# Patient Record
Sex: MALE | Race: WHITE | Employment: OTHER | URBAN - METROPOLITAN AREA
[De-identification: names, ages, dates, MRNs, and addresses within clinical notes are randomized per-mention and may not be internally consistent; named-entity substitution may affect disease eponyms.]

---

## 2017-04-17 ENCOUNTER — HOSPITAL ENCOUNTER (OUTPATIENT)
Dept: ULTRASOUND IMAGING | Age: 57
Discharge: HOME | End: 2017-04-17
Attending: FAMILY MEDICINE
Payer: COMMERCIAL

## 2017-04-17 DIAGNOSIS — R79.89 ELEVATED LIVER FUNCTION TESTS: ICD-10-CM

## 2017-04-17 PROCEDURE — 76700 US EXAM ABDOM COMPLETE: CPT

## 2017-04-17 NOTE — PROGRESS NOTES
US shows fatty liver disease. Recommend low fat/low cholesterol diet, avoiding liver toxic substances such as excessive alcohol, acetaminophen/tylenol, high dose statin therapy.    My chart message sent

## 2018-05-16 ENCOUNTER — APPOINTMENT (RX ONLY)
Dept: URBAN - METROPOLITAN AREA CLINIC 349 | Facility: CLINIC | Age: 58
Setting detail: DERMATOLOGY
End: 2018-05-16

## 2018-05-16 DIAGNOSIS — L82.1 OTHER SEBORRHEIC KERATOSIS: ICD-10-CM

## 2018-05-16 DIAGNOSIS — L82.0 INFLAMED SEBORRHEIC KERATOSIS: ICD-10-CM

## 2018-05-16 DIAGNOSIS — Z71.89 OTHER SPECIFIED COUNSELING: ICD-10-CM

## 2018-05-16 PROCEDURE — 17110 DESTRUCTION B9 LES UP TO 14: CPT

## 2018-05-16 PROCEDURE — ? COUNSELING

## 2018-05-16 PROCEDURE — ? LIQUID NITROGEN

## 2018-05-16 PROCEDURE — 99202 OFFICE O/P NEW SF 15 MIN: CPT | Mod: 25

## 2018-05-16 PROCEDURE — ? BENIGN DESTRUCTION

## 2018-05-16 ASSESSMENT — LOCATION SIMPLE DESCRIPTION DERM
LOCATION SIMPLE: ABDOMEN
LOCATION SIMPLE: RIGHT CLAVICULAR SKIN
LOCATION SIMPLE: ABDOMEN
LOCATION SIMPLE: RIGHT CHEEK
LOCATION SIMPLE: LEFT INFERIOR EYELID
LOCATION SIMPLE: LEFT UPPER BACK
LOCATION SIMPLE: RIGHT SCALP
LOCATION SIMPLE: RIGHT ANTERIOR NECK
LOCATION SIMPLE: RIGHT FOREHEAD
LOCATION SIMPLE: LEFT UPPER BACK
LOCATION SIMPLE: CHEST
LOCATION SIMPLE: RIGHT CHEEK
LOCATION SIMPLE: RIGHT UPPER BACK

## 2018-05-16 ASSESSMENT — LOCATION DETAILED DESCRIPTION DERM
LOCATION DETAILED: RIGHT CENTRAL FRONTAL SCALP
LOCATION DETAILED: RIGHT INFERIOR MEDIAL UPPER BACK
LOCATION DETAILED: LEFT MEDIAL INFERIOR EYELID
LOCATION DETAILED: RIGHT CENTRAL MALAR CHEEK
LOCATION DETAILED: LEFT LATERAL ABDOMEN
LOCATION DETAILED: RIGHT CLAVICULAR NECK
LOCATION DETAILED: LEFT SUPERIOR UPPER BACK
LOCATION DETAILED: RIGHT CENTRAL MALAR CHEEK
LOCATION DETAILED: EPIGASTRIC SKIN
LOCATION DETAILED: RIGHT CLAVICULAR SKIN
LOCATION DETAILED: RIGHT SUPERIOR FOREHEAD
LOCATION DETAILED: LEFT MEDIAL INFERIOR CHEST
LOCATION DETAILED: LEFT MID-UPPER BACK

## 2018-05-16 ASSESSMENT — LOCATION ZONE DERM
LOCATION ZONE: NECK
LOCATION ZONE: FACE
LOCATION ZONE: SCALP
LOCATION ZONE: EYELID
LOCATION ZONE: TRUNK
LOCATION ZONE: FACE
LOCATION ZONE: TRUNK

## 2018-05-16 NOTE — PROCEDURE: BENIGN DESTRUCTION
Medical Necessity Clause: This procedure was medically necessary because the lesions that were treated were:
Anesthesia Volume In Cc: 0
Post-Care Instructions: I reviewed with the patient in detail post-care instructions. Patient is to wear sunprotection, and avoid picking at any of the treated lesions. Pt may apply Vaseline to crusted or scabbing areas.
Add 52 Modifier (Optional): no
Detail Level: Zone
Include Z78.9 (Other Specified Conditions Influencing Health Status) As An Associated Diagnosis?: Yes
Medical Necessity Information: It is in your best interest to select a reason for this procedure from the list below. All of these items fulfill various CMS LCD requirements except the new and changing color options.
Consent: The patient's consent was obtained including but not limited to risks of crusting, scabbing, blistering, scarring, darker or lighter pigmentary change, recurrence, incomplete removal and infection.

## 2018-05-16 NOTE — PROCEDURE: LIQUID NITROGEN
Include Z78.9 (Other Specified Conditions Influencing Health Status) As An Associated Diagnosis?: Yes
Medical Necessity Information: It is in your best interest to select a reason for this procedure from the list below. All of these items fulfill various CMS LCD requirements except the new and changing color options.
Post-Care Instructions: I reviewed with the patient in detail post-care instructions. Patient is to wear sunprotection, and avoid picking at any of the treated lesions. Pt may apply Vaseline to crusted or scabbing areas.
Render Post-Care Instructions In Note?: no
Detail Level: Detailed
Number Of Freeze-Thaw Cycles: 1 freeze-thaw cycle
Consent: The patient's consent was obtained including but not limited to risks of crusting, scabbing, blistering, scarring, darker or lighter pigmentary change, recurrence, incomplete removal and infection.
Medical Necessity Clause: This procedure was medically necessary because the lesions that were treated were:

## 2019-05-15 ENCOUNTER — APPOINTMENT (RX ONLY)
Dept: URBAN - METROPOLITAN AREA CLINIC 349 | Facility: CLINIC | Age: 59
Setting detail: DERMATOLOGY
End: 2019-05-15

## 2019-05-15 DIAGNOSIS — L82.1 OTHER SEBORRHEIC KERATOSIS: ICD-10-CM

## 2019-05-15 DIAGNOSIS — L82.0 INFLAMED SEBORRHEIC KERATOSIS: ICD-10-CM

## 2019-05-15 PROCEDURE — ? BENIGN DESTRUCTION

## 2019-05-15 PROCEDURE — 99213 OFFICE O/P EST LOW 20 MIN: CPT | Mod: 25

## 2019-05-15 PROCEDURE — ? COUNSELING

## 2019-05-15 PROCEDURE — 17110 DESTRUCTION B9 LES UP TO 14: CPT

## 2019-05-15 PROCEDURE — ? LIQUID NITROGEN

## 2019-05-15 ASSESSMENT — LOCATION DETAILED DESCRIPTION DERM
LOCATION DETAILED: EPIGASTRIC SKIN
LOCATION DETAILED: LEFT MEDIAL INFERIOR CHEST
LOCATION DETAILED: RIGHT CENTRAL MALAR CHEEK
LOCATION DETAILED: LEFT MEDIAL INFERIOR EYELID
LOCATION DETAILED: LEFT MID-UPPER BACK
LOCATION DETAILED: LEFT DISTAL POSTERIOR UPPER ARM
LOCATION DETAILED: RIGHT CENTRAL MALAR CHEEK
LOCATION DETAILED: RIGHT SUPERIOR CENTRAL MALAR CHEEK
LOCATION DETAILED: LEFT SUPERIOR MEDIAL MALAR CHEEK
LOCATION DETAILED: RIGHT INFERIOR MEDIAL UPPER BACK
LOCATION DETAILED: LEFT SUPERIOR UPPER BACK
LOCATION DETAILED: LEFT DISTAL POSTERIOR UPPER ARM
LOCATION DETAILED: LEFT LATERAL ABDOMEN
LOCATION DETAILED: RIGHT SUPERIOR MEDIAL MALAR CHEEK

## 2019-05-15 ASSESSMENT — LOCATION SIMPLE DESCRIPTION DERM
LOCATION SIMPLE: RIGHT UPPER BACK
LOCATION SIMPLE: RIGHT CHEEK
LOCATION SIMPLE: RIGHT CHEEK
LOCATION SIMPLE: ABDOMEN
LOCATION SIMPLE: LEFT CHEEK
LOCATION SIMPLE: LEFT UPPER ARM
LOCATION SIMPLE: CHEST
LOCATION SIMPLE: LEFT UPPER ARM
LOCATION SIMPLE: LEFT UPPER BACK
LOCATION SIMPLE: ABDOMEN
LOCATION SIMPLE: LEFT INFERIOR EYELID
LOCATION SIMPLE: LEFT UPPER BACK

## 2019-05-15 ASSESSMENT — LOCATION ZONE DERM
LOCATION ZONE: ARM
LOCATION ZONE: EYELID
LOCATION ZONE: FACE
LOCATION ZONE: TRUNK
LOCATION ZONE: TRUNK
LOCATION ZONE: FACE
LOCATION ZONE: ARM

## 2019-05-15 NOTE — PROCEDURE: BENIGN DESTRUCTION
Post-Care Instructions: I reviewed with the patient in detail post-care instructions. Patient is to wear sunprotection, and avoid picking at any of the treated lesions. Pt may apply Vaseline to crusted or scabbing areas.
Render Note In Bullet Format When Appropriate: No
Medical Necessity Clause: This procedure was medically necessary because the lesions that were treated were:
Treatment Number (Will Not Render If 0): 0
Consent: The patient's consent was obtained including but not limited to risks of crusting, scabbing, blistering, scarring, darker or lighter pigmentary change, recurrence, incomplete removal and infection.
Detail Level: Zone
Include Z78.9 (Other Specified Conditions Influencing Health Status) As An Associated Diagnosis?: Yes
Medical Necessity Information: It is in your best interest to select a reason for this procedure from the list below. All of these items fulfill various CMS LCD requirements except the new and changing color options.

## 2019-05-15 NOTE — PROCEDURE: LIQUID NITROGEN
Render Post-Care Instructions In Note?: no
Include Z78.9 (Other Specified Conditions Influencing Health Status) As An Associated Diagnosis?: Yes
Post-Care Instructions: I reviewed with the patient in detail post-care instructions. Patient is to wear sunprotection, and avoid picking at any of the treated lesions. Pt may apply Vaseline to crusted or scabbing areas.
Medical Necessity Information: It is in your best interest to select a reason for this procedure from the list below. All of these items fulfill various CMS LCD requirements except the new and changing color options.
Detail Level: Detailed
Consent: The patient's consent was obtained including but not limited to risks of crusting, scabbing, blistering, scarring, darker or lighter pigmentary change, recurrence, incomplete removal and infection.
Duration Of Freeze Thaw-Cycle (Seconds): 2
Number Of Freeze-Thaw Cycles: 3 freeze-thaw cycles
Medical Necessity Clause: This procedure was medically necessary because the lesions that were treated were:

## 2019-10-17 ENCOUNTER — HOSPITAL ENCOUNTER (OUTPATIENT)
Dept: CT IMAGING | Age: 59
Discharge: HOME | End: 2019-10-17
Payer: COMMERCIAL

## 2019-10-17 DIAGNOSIS — R91.1 LUNG NODULE: ICD-10-CM

## 2019-10-17 PROCEDURE — 71250 CT THORAX DX C-: CPT

## 2020-05-19 ENCOUNTER — APPOINTMENT (RX ONLY)
Dept: URBAN - METROPOLITAN AREA CLINIC 349 | Facility: CLINIC | Age: 60
Setting detail: DERMATOLOGY
End: 2020-05-19

## 2020-05-19 DIAGNOSIS — L82.1 OTHER SEBORRHEIC KERATOSIS: ICD-10-CM

## 2020-05-19 DIAGNOSIS — L82.0 INFLAMED SEBORRHEIC KERATOSIS: ICD-10-CM | Status: INADEQUATELY CONTROLLED

## 2020-05-19 DIAGNOSIS — D22 MELANOCYTIC NEVI: ICD-10-CM

## 2020-05-19 DIAGNOSIS — Z12.83 ENCOUNTER FOR SCREENING FOR MALIGNANT NEOPLASM OF SKIN: ICD-10-CM

## 2020-05-19 PROBLEM — D22.5 MELANOCYTIC NEVI OF TRUNK: Status: ACTIVE | Noted: 2020-05-19

## 2020-05-19 PROCEDURE — ? COUNSELING

## 2020-05-19 PROCEDURE — ? LIQUID NITROGEN

## 2020-05-19 PROCEDURE — 17110 DESTRUCTION B9 LES UP TO 14: CPT

## 2020-05-19 PROCEDURE — 99213 OFFICE O/P EST LOW 20 MIN: CPT | Mod: 25

## 2020-05-19 ASSESSMENT — LOCATION DETAILED DESCRIPTION DERM
LOCATION DETAILED: RIGHT PROXIMAL CALF
LOCATION DETAILED: SUPERIOR THORACIC SPINE
LOCATION DETAILED: RIGHT MEDIAL UPPER BACK
LOCATION DETAILED: RIGHT INFERIOR MEDIAL UPPER BACK
LOCATION DETAILED: RIGHT PROXIMAL PRETIBIAL REGION
LOCATION DETAILED: LEFT SUPERIOR MEDIAL UPPER BACK
LOCATION DETAILED: RIGHT ANTERIOR DISTAL THIGH

## 2020-05-19 ASSESSMENT — LOCATION ZONE DERM
LOCATION ZONE: LEG
LOCATION ZONE: TRUNK

## 2020-05-19 ASSESSMENT — LOCATION SIMPLE DESCRIPTION DERM
LOCATION SIMPLE: UPPER BACK
LOCATION SIMPLE: RIGHT PRETIBIAL REGION
LOCATION SIMPLE: RIGHT CALF
LOCATION SIMPLE: RIGHT UPPER BACK
LOCATION SIMPLE: LEFT UPPER BACK
LOCATION SIMPLE: RIGHT THIGH

## 2020-05-19 NOTE — PROCEDURE: LIQUID NITROGEN
Include Z78.9 (Other Specified Conditions Influencing Health Status) As An Associated Diagnosis?: Yes
Medical Necessity Clause: This procedure was medically necessary because the lesions that were treated were:
Post-Care Instructions: I reviewed with the patient in detail post-care instructions. Patient is to wear sunprotection, and avoid picking at any of the treated lesions. Pt may apply Vaseline to crusted or scabbing areas.
Medical Necessity Information: It is in your best interest to select a reason for this procedure from the list below. All of these items fulfill various CMS LCD requirements except the new and changing color options.
Consent: The patient's consent was obtained including but not limited to risks of crusting, scabbing, blistering, scarring, darker or lighter pigmentary change, recurrence, incomplete removal and infection.
Detail Level: Detailed
Duration Of Freeze Thaw-Cycle (Seconds): 2
Render Post-Care Instructions In Note?: no
Number Of Freeze-Thaw Cycles: 3 freeze-thaw cycles

## 2020-09-29 NOTE — H&P (VIEW-ONLY)
Jewel Gallardo MD, [facility], [address] [place] [address]  Tyron Paez  Phone [phone]   Fax [phone]      Date of visit: 9/29/2020     Primary/Requesting provider: Ya Meza MD    Chief Complaint   Patient presents with    New Patient     umbilical hernia          HISTORY OF PRESENT ILLNESS  Orval Escalera is a 62 y.o. male. HPI  Patient is a 62 y.o. male who presents for evaluation of a hernia. This is located in his umbilicus. He has been aware of this for 2-3 years. He doesn't think it is increasing in size, at its worst, although it does fluctuate in size. There is occasional tenderness to touch but it doesn't seem to be prohibiting or limiting him in his gym rehab from back surgery. He also denies nausea, vomiting, or alteration of baseline bowel or bladder function. He has not had any prior umbilical surgery. Medications:   Current Outpatient Medications   Medication Sig    gabapentin (NEURONTIN) 600 mg tablet Take 1 Tab by mouth two (2) times a day.  amLODIPine-benazepril (LOTREL) 5-10 mg per capsule Take 1 Cap by mouth daily.  omeprazole (PRILOSEC) 20 mg capsule Take 1 Cap by mouth daily.  therapeutic multivitamin (THERAGRAN) tablet Take 1 Tab by mouth. No current facility-administered medications for this visit. Allergies:    Allergies   Allergen Reactions    Penicillin G Hives        Past History:  Past Medical History:   Diagnosis Date    Arthritis     Chronic pain     C6-C7 bulging disc; Neuro eval. 2010    Colon polyps     Elevated liver function tests 2013    while on fenofibrate    Hypercholesterolemia     Hypertension     Polycythemia 2013    normal erythropoietin level      Past Surgical History:   Procedure Laterality Date    HX BACK SURGERY  04/2020    L3-4 microdiskectomy    HX COLONOSCOPY  6/2014, 7/19/18    repeat 5 yrs    HX ORTHOPAEDIC  06/2016    right elbow with torn tendons    HX ORTHOPAEDIC  age 12    fractured leg Family and Social History:  Family History   Problem Relation Age of Onset    Heart Failure Mother     No Known Problems Father     No Known Problems Brother     No Known Problems Brother     Colon Cancer Other         cousin  age 36    Cancer Other         colon cancer in first cousin    Diabetes Maternal Grandfather     Cancer Maternal Aunt         oral cancer     Social History     Socioeconomic History    Marital status:      Spouse name: Not on file    Number of children: Not on file    Years of education: Not on file    Highest education level: Not on file   Occupational History    Not on file   Social Needs    Financial resource strain: Not on file    Food insecurity     Worry: Not on file     Inability: Not on file   [employer] needs     Medical: Not on file     Non-medical: Not on file   Tobacco Use    Smoking status: Current Some Day Smoker     Packs/day: 0.10     Years: 20.00     Pack years: 2.00     Types: Cigars    Smokeless tobacco: Never Used    Tobacco comment: cigars. Substance and Sexual Activity    Alcohol use:  Yes     Alcohol/week: 6.0 standard drinks     Types: 6 Cans of beer per week    Drug use: No    Sexual activity: Not on file   Lifestyle    Physical activity     Days per week: Not on file     Minutes per session: Not on file    Stress: Not on file   Relationships    Social connections     Talks on phone: Not on file     Gets together: Not on file     Attends [employer] service: Not on file     Active member of club or organization: Not on file     Attends meetings of clubs or organizations: Not on file     Relationship status: Not on file    Intimate partner violence     Fear of current or ex partner: Not on file     Emotionally abused: Not on file     Physically abused: Not on file     Forced sexual activity: Not on file   Other Topics Concern    Not on file   Social History Narrative    Not on file       Review of Systems Constitutional: Negative. HENT: Negative. Eyes:        Glasses   Respiratory: Negative. Cardiovascular: Negative. Gastrointestinal: Negative. Genitourinary: Negative. Musculoskeletal: Positive for back pain. Reports right leg muscle mass loss due to back issues   Skin:        Umbilical hernia   Neurological: Negative. Endo/Heme/Allergies: Negative. Psychiatric/Behavioral: Negative. Physical Exam  Vitals signs and nursing note reviewed. Constitutional:       Appearance: He is well-developed. He is not toxic-appearing or diaphoretic. HENT:      Head: Normocephalic and atraumatic. Eyes:      General: No scleral icterus. Conjunctiva/sclera: Conjunctivae normal.      Pupils: Pupils are equal, round, and reactive to light. Neck:      Musculoskeletal: Normal range of motion. Thyroid: No thyromegaly. Vascular: No JVD. Trachea: No tracheal deviation. Cardiovascular:      Rate and Rhythm: Normal rate and regular rhythm. Heart sounds: No murmur. No friction rub. No gallop. Pulmonary:      Effort: Pulmonary effort is normal. No respiratory distress. Breath sounds: Normal breath sounds. No wheezing or rales. Abdominal:      General: There is no distension. Palpations: Abdomen is soft. There is no mass. Tenderness: There is no abdominal tenderness. Hernia: A hernia (fat containing umbilical hernia, approx 15mm defect) is present. Musculoskeletal:      Comments: No gross deformities   Skin:     General: Skin is warm. Neurological:      General: No focal deficit present. Mental Status: He is alert and oriented to person, place, and time. Cranial Nerves: No cranial nerve deficit. Psychiatric:         Mood and Affect: Mood normal.         Behavior: Behavior normal. Behavior is cooperative. Thought Content:  Thought content normal.         Judgment: Judgment normal.         ASSESSMENT and PLAN  Encounter Diagnoses Name Primary?  Umbilical hernia without obstruction and without gangrene Yes     Discussed low risk of enteric incarceration/strangulation event. There is a significant likelihood, however, of enlargement and increasing discomfort/inconvenience. Repair is appropriate, as is observation. After discussion, he prefers repair. Will proceed with  umbilical hernia repair with mesh. Technical details of the procedure are reviewed. Risks reviewed include risks of anesthesia, bleeding, infection, visceral injury, persistent post-operative pain, and hernia recurrence. All questions are answered.

## 2020-10-01 ENCOUNTER — HOSPITAL ENCOUNTER (OUTPATIENT)
Dept: SURGERY | Age: 60
Discharge: HOME | End: 2020-10-01

## 2020-10-02 VITALS — WEIGHT: 200 LBS | BODY MASS INDEX: 26.51 KG/M2 | HEIGHT: 73 IN

## 2020-10-02 NOTE — PERIOP NOTES
Patient verified name and . Order for consent was found in EHR and matches case posting; patient verifies procedure. Type 2 surgery, PAT phone assessment complete. Orders not received. Labs per surgeon: none  Labs per anesthesia protocol: Hgb  Patient instructed to come to [id number] [place] [address], [address] Monday-Friday [id number] prior to DOS to have blood drawn and ask for hibiclen. No appointment necessary. Patient verbalized understanding      Patient answered medical/surgical history questions at their best of ability. All prior to admission medications documented in [facility]. Patient instructed to take the following medications the day of surgery according to anesthesia guidelines with a small sip of waterLotrel, Gabapentin and Omeprazole: Hold all vitamins 7 days prior to surgery and NSAIDS 5 days prior to surgery. Prescription meds to hold:None    Patient instructed on the following:    > a negative Covid swab result is required to proceed with surgery;  appointments are made by the surgeon office and test should be collected 7 days prior to surgery. The testing center is located at the [address] [place] [address].   > 1 visitor allowed at this time. >Arrive at [facility], time of arrival to be called the day before by 1700  >NPO after midnight including gum, mints, and ice chips  >Responsible adult must drive patient to the hospital, stay during surgery, and patient will need supervision 24 hours after anesthesia  >Use hibiclens soap in shower the night before surgery and on the morning of surgery  >All piercings must be removed prior to arrival.    >Leave all valuables (money and jewelry) at home but bring insurance card and ID on       DOS. >Do not wear make-up, nail polish, lotions, cologne, perfumes, powders, or oil on skin.

## 2020-10-06 ENCOUNTER — HOSPITAL ENCOUNTER (OUTPATIENT)
Dept: LAB | Age: 60
Discharge: HOME | End: 2020-10-06
Attending: SURGERY
Payer: COMMERCIAL

## 2020-10-06 LAB — HGB BLD-MCNC: 12 G/DL (ref 13.6–17.2)

## 2020-10-06 PROCEDURE — 85018 HEMOGLOBIN: CPT

## 2020-10-06 PROCEDURE — 36415 COLL VENOUS BLD VENIPUNCTURE: CPT

## 2020-10-07 ENCOUNTER — ANESTHESIA EVENT (OUTPATIENT)
Dept: SURGERY | Age: 60
End: 2020-10-07
Payer: COMMERCIAL

## 2020-10-07 NOTE — PERIOP NOTES
HEMOGLOBIN [[id number]] (Order [id number])   Lab   Date: 10/6/2020  Department: [facility]  Released By: Baltazar Chau (auto-released)  Authorizing: Alvarado Guevara MD    Component  Value  Flag  Ref Range  Units  Status    HGB  12.0          HGB within [facility] protocols.

## 2020-10-08 ENCOUNTER — HOSPITAL ENCOUNTER (OUTPATIENT)
Age: 60
Setting detail: OUTPATIENT SURGERY
Discharge: HOME | End: 2020-10-08
Attending: SURGERY | Admitting: SURGERY
Payer: COMMERCIAL

## 2020-10-08 ENCOUNTER — ANESTHESIA (OUTPATIENT)
Dept: SURGERY | Age: 60
End: 2020-10-08
Payer: COMMERCIAL

## 2020-10-08 VITALS
BODY MASS INDEX: 27.59 KG/M2 | RESPIRATION RATE: 16 BRPM | SYSTOLIC BLOOD PRESSURE: 129 MMHG | OXYGEN SATURATION: 94 % | WEIGHT: 203.7 LBS | HEART RATE: 67 BPM | HEIGHT: 72 IN | DIASTOLIC BLOOD PRESSURE: 64 MMHG | TEMPERATURE: 98.3 F

## 2020-10-08 DIAGNOSIS — K42.9 UMBILICAL HERNIA WITHOUT OBSTRUCTION AND WITHOUT GANGRENE: Primary | ICD-10-CM

## 2020-10-08 PROCEDURE — 76010000138 HC OR TIME 0.5 TO 1 HR: Performed by: SURGERY

## 2020-10-08 PROCEDURE — 77030040361 HC SLV COMPR DVT MDII -B: Performed by: SURGERY

## 2020-10-08 PROCEDURE — 74011250636 HC RX REV CODE- 250/636: Performed by: ANESTHESIOLOGY

## 2020-10-08 PROCEDURE — 77030040922 HC BLNKT HYPOTHRM STRY -A: Performed by: ANESTHESIOLOGY

## 2020-10-08 PROCEDURE — 76060000032 HC ANESTHESIA 0.5 TO 1 HR: Performed by: SURGERY

## 2020-10-08 PROCEDURE — 77030002986 HC SUT PROL J&J -A: Performed by: SURGERY

## 2020-10-08 PROCEDURE — 76210000006 HC OR PH I REC 0.5 TO 1 HR: Performed by: SURGERY

## 2020-10-08 PROCEDURE — 76210000020 HC REC RM PH II FIRST 0.5 HR: Performed by: SURGERY

## 2020-10-08 PROCEDURE — 49585 PR REPAIR UMBILICAL HERN,5+Y/O,REDUC: CPT | Performed by: SURGERY

## 2020-10-08 PROCEDURE — 74011250636 HC RX REV CODE- 250/636: Performed by: SURGERY

## 2020-10-08 PROCEDURE — 74011250637 HC RX REV CODE- 250/637: Performed by: ANESTHESIOLOGY

## 2020-10-08 PROCEDURE — 74011000250 HC RX REV CODE- 250: Performed by: ANESTHESIOLOGY

## 2020-10-08 PROCEDURE — 74011000250 HC RX REV CODE- 250: Performed by: SURGERY

## 2020-10-08 PROCEDURE — 2709999900 HC NON-CHARGEABLE SUPPLY: Performed by: SURGERY

## 2020-10-08 PROCEDURE — 74011000250 HC RX REV CODE- 250: Performed by: NURSE ANESTHETIST, CERTIFIED REGISTERED

## 2020-10-08 PROCEDURE — 77030031139 HC SUT VCRL2 J&J -A: Performed by: SURGERY

## 2020-10-08 PROCEDURE — C1781 MESH (IMPLANTABLE): HCPCS | Performed by: SURGERY

## 2020-10-08 PROCEDURE — 77030010509 HC AIRWY LMA MSK TELE -A: Performed by: ANESTHESIOLOGY

## 2020-10-08 PROCEDURE — 74011250636 HC RX REV CODE- 250/636: Performed by: NURSE ANESTHETIST, CERTIFIED REGISTERED

## 2020-10-08 DEVICE — MESH HERN CIR SM 4.3CM PTFE --: Type: IMPLANTABLE DEVICE | Site: UMBILICAL | Status: FUNCTIONAL

## 2020-10-08 RX ORDER — LIDOCAINE HYDROCHLORIDE 10 MG/ML
0.1 INJECTION INFILTRATION; PERINEURAL AS NEEDED
Status: DISCONTINUED | OUTPATIENT
Start: 2020-10-08 | End: 2020-10-08 | Stop reason: HOSPADM

## 2020-10-08 RX ORDER — DEXAMETHASONE SODIUM PHOSPHATE 4 MG/ML
INJECTION, SOLUTION INTRA-ARTICULAR; INTRALESIONAL; INTRAMUSCULAR; INTRAVENOUS; SOFT TISSUE AS NEEDED
Status: DISCONTINUED | OUTPATIENT
Start: 2020-10-08 | End: 2020-10-08 | Stop reason: HOSPADM

## 2020-10-08 RX ORDER — CEFAZOLIN SODIUM 1 G/3ML
2-3 INJECTION, POWDER, FOR SOLUTION INTRAMUSCULAR; INTRAVENOUS
Status: DISCONTINUED | OUTPATIENT
Start: 2020-10-08 | End: 2020-10-08 | Stop reason: SDUPTHER

## 2020-10-08 RX ORDER — HYDROMORPHONE HYDROCHLORIDE 2 MG/ML
INJECTION, SOLUTION INTRAMUSCULAR; INTRAVENOUS; SUBCUTANEOUS AS NEEDED
Status: DISCONTINUED | OUTPATIENT
Start: 2020-10-08 | End: 2020-10-08 | Stop reason: HOSPADM

## 2020-10-08 RX ORDER — EPHEDRINE SULFATE/0.9% NACL/PF 50 MG/5 ML
SYRINGE (ML) INTRAVENOUS AS NEEDED
Status: DISCONTINUED | OUTPATIENT
Start: 2020-10-08 | End: 2020-10-08 | Stop reason: HOSPADM

## 2020-10-08 RX ORDER — MIDAZOLAM HYDROCHLORIDE 1 MG/ML
2 INJECTION, SOLUTION INTRAMUSCULAR; INTRAVENOUS
Status: COMPLETED | OUTPATIENT
Start: 2020-10-08 | End: 2020-10-08

## 2020-10-08 RX ORDER — HYDROMORPHONE HYDROCHLORIDE 2 MG/ML
0.5 INJECTION, SOLUTION INTRAMUSCULAR; INTRAVENOUS; SUBCUTANEOUS
Status: DISCONTINUED | OUTPATIENT
Start: 2020-10-08 | End: 2020-10-08 | Stop reason: HOSPADM

## 2020-10-08 RX ORDER — OXYCODONE HYDROCHLORIDE 5 MG/1
5 TABLET ORAL
Status: COMPLETED | OUTPATIENT
Start: 2020-10-08 | End: 2020-10-08

## 2020-10-08 RX ORDER — HYDROCODONE BITARTRATE AND ACETAMINOPHEN 5; 325 MG/1; MG/1
TABLET ORAL
Qty: 20 TAB | Refills: 0 | Status: SHIPPED | OUTPATIENT
Start: 2020-10-08 | End: 2020-10-15

## 2020-10-08 RX ORDER — CEFAZOLIN SODIUM/WATER 2 G/20 ML
2 SYRINGE (ML) INTRAVENOUS ONCE
Status: COMPLETED | OUTPATIENT
Start: 2020-10-08 | End: 2020-10-08

## 2020-10-08 RX ORDER — DIPHENHYDRAMINE HYDROCHLORIDE 50 MG/ML
12.5 INJECTION, SOLUTION INTRAMUSCULAR; INTRAVENOUS
Status: DISCONTINUED | OUTPATIENT
Start: 2020-10-08 | End: 2020-10-08 | Stop reason: HOSPADM

## 2020-10-08 RX ORDER — SODIUM CHLORIDE, SODIUM LACTATE, POTASSIUM CHLORIDE, CALCIUM CHLORIDE 600; 310; 30; 20 MG/100ML; MG/100ML; MG/100ML; MG/100ML
75 INJECTION, SOLUTION INTRAVENOUS CONTINUOUS
Status: DISCONTINUED | OUTPATIENT
Start: 2020-10-08 | End: 2020-10-08 | Stop reason: HOSPADM

## 2020-10-08 RX ORDER — BUPIVACAINE HYDROCHLORIDE 2.5 MG/ML
INJECTION, SOLUTION EPIDURAL; INFILTRATION; INTRACAUDAL AS NEEDED
Status: DISCONTINUED | OUTPATIENT
Start: 2020-10-08 | End: 2020-10-08 | Stop reason: HOSPADM

## 2020-10-08 RX ORDER — ONDANSETRON 2 MG/ML
INJECTION INTRAMUSCULAR; INTRAVENOUS AS NEEDED
Status: DISCONTINUED | OUTPATIENT
Start: 2020-10-08 | End: 2020-10-08 | Stop reason: HOSPADM

## 2020-10-08 RX ORDER — LIDOCAINE HYDROCHLORIDE 20 MG/ML
INJECTION, SOLUTION EPIDURAL; INFILTRATION; INTRACAUDAL; PERINEURAL AS NEEDED
Status: DISCONTINUED | OUTPATIENT
Start: 2020-10-08 | End: 2020-10-08 | Stop reason: HOSPADM

## 2020-10-08 RX ORDER — PROPOFOL 10 MG/ML
INJECTION, EMULSION INTRAVENOUS AS NEEDED
Status: DISCONTINUED | OUTPATIENT
Start: 2020-10-08 | End: 2020-10-08 | Stop reason: HOSPADM

## 2020-10-08 RX ORDER — HALOPERIDOL 5 MG/ML
1 INJECTION INTRAMUSCULAR
Status: DISCONTINUED | OUTPATIENT
Start: 2020-10-08 | End: 2020-10-08 | Stop reason: HOSPADM

## 2020-10-08 RX ORDER — FLUMAZENIL 0.1 MG/ML
0.2 INJECTION INTRAVENOUS
Status: DISCONTINUED | OUTPATIENT
Start: 2020-10-08 | End: 2020-10-08 | Stop reason: HOSPADM

## 2020-10-08 RX ORDER — SODIUM CHLORIDE, SODIUM LACTATE, POTASSIUM CHLORIDE, CALCIUM CHLORIDE 600; 310; 30; 20 MG/100ML; MG/100ML; MG/100ML; MG/100ML
100 INJECTION, SOLUTION INTRAVENOUS CONTINUOUS
Status: DISCONTINUED | OUTPATIENT
Start: 2020-10-08 | End: 2020-10-08 | Stop reason: HOSPADM

## 2020-10-08 RX ORDER — KETOROLAC TROMETHAMINE 30 MG/ML
INJECTION, SOLUTION INTRAMUSCULAR; INTRAVENOUS AS NEEDED
Status: DISCONTINUED | OUTPATIENT
Start: 2020-10-08 | End: 2020-10-08 | Stop reason: HOSPADM

## 2020-10-08 RX ORDER — NALOXONE HYDROCHLORIDE 0.4 MG/ML
0.1 INJECTION, SOLUTION INTRAMUSCULAR; INTRAVENOUS; SUBCUTANEOUS
Status: DISCONTINUED | OUTPATIENT
Start: 2020-10-08 | End: 2020-10-08 | Stop reason: HOSPADM

## 2020-10-08 RX ORDER — FENTANYL CITRATE 50 UG/ML
INJECTION, SOLUTION INTRAMUSCULAR; INTRAVENOUS AS NEEDED
Status: DISCONTINUED | OUTPATIENT
Start: 2020-10-08 | End: 2020-10-08 | Stop reason: HOSPADM

## 2020-10-08 RX ADMIN — PROPOFOL 200 MG: 10 INJECTION, EMULSION INTRAVENOUS at 12:57

## 2020-10-08 RX ADMIN — Medication 2 G: at 13:04

## 2020-10-08 RX ADMIN — MIDAZOLAM 2 MG: 1 INJECTION INTRAMUSCULAR; INTRAVENOUS at 12:48

## 2020-10-08 RX ADMIN — HYDROMORPHONE HYDROCHLORIDE 1 MG: 2 INJECTION INTRAMUSCULAR; INTRAVENOUS; SUBCUTANEOUS at 13:08

## 2020-10-08 RX ADMIN — SODIUM CHLORIDE, SODIUM LACTATE, POTASSIUM CHLORIDE, AND CALCIUM CHLORIDE 100 ML/HR: 600; 310; 30; 20 INJECTION, SOLUTION INTRAVENOUS at 12:04

## 2020-10-08 RX ADMIN — OXYCODONE 5 MG: 5 TABLET ORAL at 14:04

## 2020-10-08 RX ADMIN — Medication 25 MG: at 13:17

## 2020-10-08 RX ADMIN — LIDOCAINE HYDROCHLORIDE 0.1 ML: 10 INJECTION, SOLUTION INFILTRATION; PERINEURAL at 12:05

## 2020-10-08 RX ADMIN — DEXAMETHASONE SODIUM PHOSPHATE 10 MG: 4 INJECTION, SOLUTION INTRAMUSCULAR; INTRAVENOUS at 13:04

## 2020-10-08 RX ADMIN — ONDANSETRON 4 MG: 2 INJECTION INTRAMUSCULAR; INTRAVENOUS at 13:28

## 2020-10-08 RX ADMIN — KETOROLAC TROMETHAMINE 30 MG: 30 INJECTION, SOLUTION INTRAMUSCULAR; INTRAVENOUS at 13:28

## 2020-10-08 RX ADMIN — Medication 10 MG: at 13:05

## 2020-10-08 RX ADMIN — FENTANYL CITRATE 50 MCG: 50 INJECTION INTRAMUSCULAR; INTRAVENOUS at 12:57

## 2020-10-08 RX ADMIN — LIDOCAINE HYDROCHLORIDE 100 MG: 20 INJECTION, SOLUTION EPIDURAL; INFILTRATION; INTRACAUDAL; PERINEURAL at 12:57

## 2020-10-08 RX ADMIN — SODIUM CHLORIDE, SODIUM LACTATE, POTASSIUM CHLORIDE, AND CALCIUM CHLORIDE: 600; 310; 30; 20 INJECTION, SOLUTION INTRAVENOUS at 13:19

## 2020-10-08 RX ADMIN — Medication 15 MG: at 13:08

## 2020-10-08 NOTE — OP NOTES
Operative Report    Patient: Jack Baron MRN: [id number]     YOB: 1960  Age: 62 y.o. Sex: male       Date of Surgery: 10/8/2020     Preoperative Diagnosis: Umbilical hernia without obstruction and without gangrene [K42.9]     Postoperative Diagnosis: Umbilical hernia without obstruction and without gangrene [K42.9]     Procedure:  Umbilical Hernia Repair with mesh     Anesthesia: General     Complications: none    EBL: minimal    Indications:  As outlined in History and Physical.    Procedure Details   Informed consent was obtained and the patient was brought to the operating room and placed on the table in a supine position. After successful induction of anesthesia, the abdomen was prepped and draped in the standard fashion. A curvilinear incision was made from the 3 o'clock to the 9 o'clock position and cautery and blunt dissection was used to identify and isolate the hernia stalk from the surrounding tissues. The umbilical skin was then dissected off of the contents and reflected superiorly. The hernia was then amputated at the fascial level, revealing a 1.5cm fascial defect. Digital exploration of the abdominal wall did not reveal additional defects and did not reveal adhesions. The tissue quality of the fascia was noted to be strong. A small Ventralex patch was thus brought to the field and inserted through the defect with care taken to avoid entrapment of any visceral structures and assured to be completely unfurled. The positioning straps were then trimmed and sutured to the sides of the defect with 2-0 Prolene. This was also used to close the defect transversely over the mesh. The area was then irrigated and confirmed hemostatic and infiltrated with additional local.  The umbilical skin was then tacked to the fascia with 3-0 vicryl to recreate the umbilicus,   and then closed with subcuticular 4-0 Vicryl.    Steri-Strips, a tonsil sponge,  telfa, and a tegaderm dressing was applied as a pressure dressing. The patient tolerated the procedure well and was awakened from anesthesia and taken to recovery in satisfactory condition. Sponge, needle, and instrument counts were correct as reported to me.     Rommel Meier MD  October 8, 2020

## 2020-10-08 NOTE — INTERVAL H&P NOTE
Update History & Physical    The Patient's History and Physical was reviewed with the patient and I examined the patient. There was no change. The surgical site was confirmed by the patient and me. Plan:  The risk, benefits, expected outcome, and alternative to the recommended procedure have been discussed with the patient. Patient understands and wants to proceed with the procedure.     Electronically signed by Tello Marr MD on 10/8/2020 at 12:44 PM

## 2020-10-08 NOTE — ANESTHESIA PREPROCEDURE EVALUATION
Anesthetic History   No history of anesthetic complications            Review of Systems / Medical History  Patient summary reviewed and pertinent labs reviewed    Pulmonary          Smoker         Neuro/Psych   Within defined limits           Cardiovascular    Hypertension          Hyperlipidemia    Exercise tolerance: >4 METS  Comments: Denies recent CP, SOB or Palpitations   GI/Hepatic/Renal  Within defined limits              Endo/Other        Arthritis     Other Findings              Physical Exam    Airway  Mallampati: II  TM Distance: > 6 cm  Neck ROM: normal range of motion   Mouth opening: Normal     Cardiovascular  Regular rate and rhythm,  S1 and S2 normal,  no murmur, click, rub, or gallop             Dental  No notable dental hx       Pulmonary  Breath sounds clear to auscultation               Abdominal  GI exam deferred       Other Findings            Anesthetic Plan    ASA: 2  Anesthesia type: general          Induction: Intravenous  Anesthetic plan and risks discussed with: Patient and Spouse

## 2020-10-08 NOTE — DISCHARGE INSTRUCTIONS
Debby Benson M.D.  [phone]    Instructions following Hernia Repair:    ACTIVITY:   Try to take a few short walks with help around the house later today. It is very important to take short walks to avoid blood clots and pneumonia.  You may be light-headed or sleepy from anesthesia, so be careful going up and down stairs. Avoid any activity that involves lifting/pushing more than 30 pounds until your followup appointment  DIET:   Drink only clear, non-carbonated liquids when you first get home (sugar-free if you are diabetic), such as Gatorade, chicken broth, etc.   Later  you may resume a more normal diet, depending on how you feel    PAIN:   You will be given a prescription for pain medication.  Try to take the pain medication with food, even a few crackers.  You may also use Tylenol, Motrin, Advil, or Aleve instead of the prescription pain medication. Do no take Tylenol and the prescription pain medication within 6 hours of each other.  URINARY RETENTION: If you are unable to empty your bladder within 6 hours after returning home, please go to your nearest Emergency Department or Urgent Care for urinary catheterization. WOUND CARE:   You may shower the day after surgery, unless instructed otherwise.  It is not uncommon for the incisions to ooze or drain blood-tinged fluid. You may remove the clear dressing on the fifth postoperative day.  Incisions will sometimes develop redness around them, up to the size of a quarter, as well as a hard lumpy feel. If this redness continues to get larger, please call the office. FOLLOW UP:   Your follow-up appointment is usually made when your surgery is arranged. Please call the office if you are not sure of this appointment. CALL THE DOCTOR IF:   You have a temperature higher than 101.5° Fahrenheit for more than 6 hours.  You have severe nausea or vomiting.  You develop increasing redness or infection at the incision.      Continue home medications as previously prescribed. After general anesthesia or intravenous sedation, for 24 hours or while taking prescription Narcotics:  · Limit your activities  · A responsible adult needs to be with you for the next 24 hours  · Do not drive and operate hazardous machinery  · Do not make important personal or business decisions  · Do not drink alcoholic beverages  · If you have not urinated within 8 hours after discharge, please contact your surgeon on call. · If you have sleep apnea and have a CPAP machine, please use it for all naps and sleeping. · Please use caution when taking narcotics and any of your home medications that may cause drowsiness. *  Please give a list of your current medications to your Primary Care Provider. *  Please update this list whenever your medications are discontinued, doses are      changed, or new medications (including over-the-counter products) are added. *  Please carry medication information at all times in case of emergency situations. These are general instructions for a healthy lifestyle:  No smoking/ No tobacco products/ Avoid exposure to second hand smoke  Surgeon General's Warning:  Quitting smoking now greatly reduces serious risk to your health. Obesity, smoking, and sedentary lifestyle greatly increases your risk for illness  A healthy diet, regular physical exercise & weight monitoring are important for maintaining a healthy lifestyle    You may be retaining fluid if you have a history of heart failure or if you experience any of the following symptoms:  Weight gain of 3 pounds or more overnight or 5 pounds in a week, increased swelling in our hands or feet or shortness of breath while lying flat in bed. Please call your doctor as soon as you notice any of these symptoms; do not wait until your next office visit.

## 2020-10-08 NOTE — ANESTHESIA POSTPROCEDURE EVALUATION
Procedure(s):  OPEN HERNIA UMBILICAL REPAIR / MESH.     general    Anesthesia Post Evaluation      Multimodal analgesia: multimodal analgesia used between 6 hours prior to anesthesia start to PACU discharge  Patient location during evaluation: PACU  Patient participation: complete - patient participated  Level of consciousness: awake  Pain management: adequate  Airway patency: patent  Anesthetic complications: no  Cardiovascular status: acceptable  Respiratory status: spontaneous ventilation and acceptable  Hydration status: acceptable  Post anesthesia nausea and vomiting:  none      INITIAL Post-op Vital signs:   Vitals Value Taken Time   /59 10/8/2020  1:55 PM   Temp 36.8 °C (98.3 °F) 10/8/2020  1:45 PM   Pulse 88 10/8/2020  1:55 PM   Resp 16 10/8/2020  1:55 PM   SpO2 96 % 10/8/2020  1:55 PM

## 2021-08-26 PROBLEM — E11.9 TYPE 2 DIABETES MELLITUS (HCC): Status: ACTIVE | Noted: 2021-08-26

## 2022-03-18 PROBLEM — E11.9 TYPE 2 DIABETES MELLITUS (HCC): Status: ACTIVE | Noted: 2021-08-26

## 2022-07-28 ENCOUNTER — TELEPHONE (OUTPATIENT)
Dept: FAMILY MEDICINE CLINIC | Facility: CLINIC | Age: 62
End: 2022-07-28

## 2022-07-28 NOTE — TELEPHONE ENCOUNTER
Patient had said he called several days ago and ask for refill of Amlodipine because his CPX with Dr. Bia Multani will be past the year ramona now and he will run out of meds. He is going out of town for two weeks. He said he spoke to someone and this was already suppose to be done. The pharmacy is [facility] on [address] [place] [address]. Thank you!

## 2022-08-01 RX ORDER — AMLODIPINE BESYLATE AND BENAZEPRIL HYDROCHLORIDE 5; 10 MG/1; MG/1
CAPSULE ORAL
Qty: 90 CAPSULE | Refills: 3 | Status: SHIPPED | OUTPATIENT
Start: 2022-08-01

## 2022-08-22 DIAGNOSIS — I10 HYPERTENSION, UNSPECIFIED TYPE: Primary | ICD-10-CM

## 2022-08-22 DIAGNOSIS — E78.00 HYPERCHOLESTEROLEMIA: ICD-10-CM

## 2022-08-23 ENCOUNTER — TELEPHONE (OUTPATIENT)
Dept: FAMILY MEDICINE CLINIC | Facility: CLINIC | Age: 62
End: 2022-08-23

## 2022-08-23 ENCOUNTER — NURSE ONLY (OUTPATIENT)
Dept: FAMILY MEDICINE CLINIC | Facility: CLINIC | Age: 62
End: 2022-08-23

## 2022-08-23 DIAGNOSIS — I10 HYPERTENSION, UNSPECIFIED TYPE: ICD-10-CM

## 2022-08-23 DIAGNOSIS — E78.00 HYPERCHOLESTEROLEMIA: ICD-10-CM

## 2022-08-23 LAB
ERYTHROCYTE [DISTWIDTH] IN BLOOD BY AUTOMATED COUNT: 13.2 % (ref 11.9–14.6)
HCT VFR BLD AUTO: 45 % (ref 41.1–50.3)
HGB BLD-MCNC: 15.1 G/DL (ref 13.6–17.2)
MCH RBC QN AUTO: 30.1 PG (ref 26.1–32.9)
MCHC RBC AUTO-ENTMCNC: 33.6 G/DL (ref 31.4–35)
MCV RBC AUTO: 89.6 FL (ref 79.6–97.8)
NRBC # BLD: 0 K/UL (ref 0–0.2)
PLATELET # BLD AUTO: 192 K/UL (ref 150–450)
PMV BLD AUTO: 10.7 FL (ref 9.4–12.3)
RBC # BLD AUTO: 5.02 M/UL (ref 4.23–5.6)
WBC # BLD AUTO: 7 K/UL (ref 4.3–11.1)

## 2022-08-23 NOTE — TELEPHONE ENCOUNTER
Omeprazole 20 mg and Gabapentin 600 mg to be sent to [facility]. Patient has cpx on Friday but is out of medication.

## 2022-08-24 LAB
ALBUMIN SERPL-MCNC: 4.2 G/DL (ref 3.2–4.6)
ALBUMIN/GLOB SERPL: 1.2 {RATIO} (ref 1.2–3.5)
ALP SERPL-CCNC: 80 U/L (ref 50–136)
ALT SERPL-CCNC: 56 U/L (ref 12–65)
ANION GAP SERPL CALC-SCNC: 9 MMOL/L (ref 7–16)
APPEARANCE UR: CLEAR
AST SERPL-CCNC: 48 U/L (ref 15–37)
BACTERIA URNS QL MICRO: NEGATIVE /HPF
BILIRUB SERPL-MCNC: 1 MG/DL (ref 0.2–1.1)
BILIRUB UR QL: NEGATIVE
BUN SERPL-MCNC: 14 MG/DL (ref 8–23)
CALCIUM SERPL-MCNC: 9.5 MG/DL (ref 8.3–10.4)
CASTS URNS QL MICRO: ABNORMAL /LPF
CHLORIDE SERPL-SCNC: 106 MMOL/L (ref 98–107)
CHOLEST SERPL-MCNC: 205 MG/DL
CO2 SERPL-SCNC: 24 MMOL/L (ref 21–32)
COLOR UR: ABNORMAL
CREAT SERPL-MCNC: 1.1 MG/DL (ref 0.8–1.5)
EPI CELLS #/AREA URNS HPF: ABNORMAL /HPF
GLOBULIN SER CALC-MCNC: 3.5 G/DL (ref 2.3–3.5)
GLUCOSE SERPL-MCNC: 124 MG/DL (ref 65–100)
GLUCOSE UR STRIP.AUTO-MCNC: NEGATIVE MG/DL
HDLC SERPL-MCNC: 49 MG/DL (ref 40–60)
HDLC SERPL: 4.2 {RATIO}
HGB UR QL STRIP: NEGATIVE
KETONES UR QL STRIP.AUTO: NEGATIVE MG/DL
LDLC SERPL CALC-MCNC: 92.8 MG/DL
LEUKOCYTE ESTERASE UR QL STRIP.AUTO: NEGATIVE
MUCOUS THREADS URNS QL MICRO: 0 /LPF
NITRITE UR QL STRIP.AUTO: NEGATIVE
PH UR STRIP: 6.5 [PH] (ref 5–9)
POTASSIUM SERPL-SCNC: 4 MMOL/L (ref 3.5–5.1)
PROT SERPL-MCNC: 7.7 G/DL (ref 6.3–8.2)
PROT UR STRIP-MCNC: NEGATIVE MG/DL
PSA SERPL-MCNC: 0.4 NG/ML
RBC #/AREA URNS HPF: ABNORMAL /HPF
SODIUM SERPL-SCNC: 139 MMOL/L (ref 138–145)
SP GR UR REFRACTOMETRY: 1.02 (ref 1–1.02)
TRIGL SERPL-MCNC: 316 MG/DL (ref 35–150)
TSH, 3RD GENERATION: 2.88 UIU/ML (ref 0.36–3.74)
URINE CULTURE IF INDICATED: ABNORMAL
UROBILINOGEN UR QL STRIP.AUTO: 1 EU/DL (ref 0.2–1)
VLDLC SERPL CALC-MCNC: 63.2 MG/DL (ref 6–23)
WBC URNS QL MICRO: ABNORMAL /HPF

## 2022-08-24 RX ORDER — GABAPENTIN 600 MG/1
600 TABLET ORAL 2 TIMES DAILY
Qty: 60 TABLET | Refills: 11 | Status: SHIPPED | OUTPATIENT
Start: 2022-08-24 | End: 2023-08-24

## 2022-08-24 RX ORDER — OMEPRAZOLE 20 MG/1
20 CAPSULE, DELAYED RELEASE ORAL DAILY
Qty: 90 CAPSULE | Refills: 3 | Status: SHIPPED | OUTPATIENT
Start: 2022-08-24

## 2022-08-26 ENCOUNTER — OFFICE VISIT (OUTPATIENT)
Dept: FAMILY MEDICINE CLINIC | Facility: CLINIC | Age: 62
End: 2022-08-26
Payer: COMMERCIAL

## 2022-08-26 VITALS
HEIGHT: 72 IN | RESPIRATION RATE: 17 BRPM | BODY MASS INDEX: 27.12 KG/M2 | SYSTOLIC BLOOD PRESSURE: 130 MMHG | DIASTOLIC BLOOD PRESSURE: 78 MMHG | HEART RATE: 69 BPM | OXYGEN SATURATION: 96 % | WEIGHT: 200.2 LBS

## 2022-08-26 DIAGNOSIS — G89.29 OTHER CHRONIC PAIN: ICD-10-CM

## 2022-08-26 DIAGNOSIS — M19.90 OSTEOARTHRITIS, UNSPECIFIED OSTEOARTHRITIS TYPE, UNSPECIFIED SITE: ICD-10-CM

## 2022-08-26 DIAGNOSIS — E78.00 PURE HYPERCHOLESTEROLEMIA, UNSPECIFIED: ICD-10-CM

## 2022-08-26 DIAGNOSIS — I10 ESSENTIAL (PRIMARY) HYPERTENSION: ICD-10-CM

## 2022-08-26 DIAGNOSIS — K21.9 GASTRO-ESOPHAGEAL REFLUX DISEASE WITHOUT ESOPHAGITIS: ICD-10-CM

## 2022-08-26 DIAGNOSIS — Z00.00 HEALTHCARE MAINTENANCE: Primary | ICD-10-CM

## 2022-08-26 PROCEDURE — 99396 PREV VISIT EST AGE 40-64: CPT | Performed by: FAMILY MEDICINE

## 2022-08-26 ASSESSMENT — PATIENT HEALTH QUESTIONNAIRE - PHQ9
1. LITTLE INTEREST OR PLEASURE IN DOING THINGS: 0
SUM OF ALL RESPONSES TO PHQ QUESTIONS 1-9: 0
2. FEELING DOWN, DEPRESSED OR HOPELESS: 0
SUM OF ALL RESPONSES TO PHQ9 QUESTIONS 1 & 2: 0
SUM OF ALL RESPONSES TO PHQ QUESTIONS 1-9: 0

## 2022-08-26 ASSESSMENT — ANXIETY QUESTIONNAIRES
7. FEELING AFRAID AS IF SOMETHING AWFUL MIGHT HAPPEN: 0
IF YOU CHECKED OFF ANY PROBLEMS ON THIS QUESTIONNAIRE, HOW DIFFICULT HAVE THESE PROBLEMS MADE IT FOR YOU TO DO YOUR WORK, TAKE CARE OF THINGS AT HOME, OR GET ALONG WITH OTHER PEOPLE: NOT DIFFICULT AT ALL
4. TROUBLE RELAXING: 0
6. BECOMING EASILY ANNOYED OR IRRITABLE: 0
1. FEELING NERVOUS, ANXIOUS, OR ON EDGE: 0
2. NOT BEING ABLE TO STOP OR CONTROL WORRYING: 0
5. BEING SO RESTLESS THAT IT IS HARD TO SIT STILL: 0
3. WORRYING TOO MUCH ABOUT DIFFERENT THINGS: 0
GAD7 TOTAL SCORE: 0

## 2022-09-07 ENCOUNTER — TELEPHONE (OUTPATIENT)
Dept: FAMILY MEDICINE CLINIC | Facility: CLINIC | Age: 62
End: 2022-09-07

## 2022-09-07 NOTE — TELEPHONE ENCOUNTER
Called patient, Let him know his score is 120 and Dr. Stanley Collazo wanted him to make an appointment in a month or so to go over, he says that he will call to schedule an appointment

## 2022-09-10 ASSESSMENT — ENCOUNTER SYMPTOMS
VOMITING: 0
BACK PAIN: 0
WHEEZING: 0
SHORTNESS OF BREATH: 0
ABDOMINAL DISTENTION: 0
EYE REDNESS: 0
RHINORRHEA: 0
SORE THROAT: 0
RESPIRATORY NEGATIVE: 1
EYES NEGATIVE: 1
HEARTBURN: 1

## 2022-09-10 NOTE — PROGRESS NOTES
HISTORY OF PRESENT ILLNESS  Gilda Evangelista is a 65 y.o. y.o. male    Other  This is a new (physical) problem. The problem has been gradually improving. Pertinent negatives include no arthralgias, chest pain, chills, fatigue, rash, sore throat or vomiting. Hypertension  This is a recurrent problem. The problem is unchanged. The problem is controlled. Pertinent negatives include no chest pain, palpitations or shortness of breath. Hyperlipidemia  This is a recurrent problem. The problem is controlled. Recent lipid tests were reviewed and are normal. Pertinent negatives include no chest pain or shortness of breath. Gastroesophageal Reflux  He complains of heartburn. He reports no chest pain, no sore throat or no wheezing. The problem has been gradually improving. Pertinent negatives include no fatigue. Arthritis  Presents for follow-up visit. He complains of pain and stiffness. The symptoms have been stable. Pertinent negatives include no fatigue or rash. Allergies   Allergen Reactions    Penicillin G Hives        Current Outpatient Medications   Medication Sig    gabapentin (NEURONTIN) 600 MG tablet Take 1 tablet by mouth 2 times daily. omeprazole (PRILOSEC) 20 MG delayed release capsule Take 1 capsule by mouth daily    amLODIPine-benazepril (LOTREL) 5-10 MG per capsule Take 1 capsule by mouth daily     No current facility-administered medications for this visit.         Past Medical History:   Diagnosis Date    Arthritis     Chronic pain     C6-C7 bulging disc; Neuro eval. 2010    Colon polyps     Elevated liver function tests 2013    while on fenofibrate    Hypercholesterolemia     Hypertension     Polycythemia 2013    normal erythropoietin level        Past Surgical History:   Procedure Laterality Date    BACK SURGERY  04/2020    L3-4 microdiskectomy    COLONOSCOPY  6/2014, 7/19/18    repeat 5 yrs    HERNIA REPAIR  09/2020    ORTHOPEDIC SURGERY  age 12    fractured leg    ORTHOPEDIC SURGERY  06/2016 Exam  Constitutional:       General: He is not in acute distress. Appearance: Normal appearance. HENT:      Head: Normocephalic. Nose: Nose normal.   Eyes:      Conjunctiva/sclera: Conjunctivae normal.   Cardiovascular:      Rate and Rhythm: Normal rate. Pulmonary:      Effort: Pulmonary effort is normal.      Breath sounds: Normal breath sounds. Abdominal:      General: Abdomen is flat. Bowel sounds are normal.      Palpations: Abdomen is soft. Musculoskeletal:         General: Normal range of motion. Cervical back: Normal range of motion. Skin:     General: Skin is warm and dry. Neurological:      General: No focal deficit present. Mental Status: He is alert.    Psychiatric:         Mood and Affect: Mood normal.       Nurse Only on 08/23/2022   Component Date Value Ref Range Status    WBC 08/23/2022 7.0  4.3 - 11.1 K/uL Final    RBC 08/23/2022 5.02  4.23 - 5.6 M/uL Final    Hemoglobin 08/23/2022 15.1  13.6 - 17.2 g/dL Final    Hematocrit 08/23/2022 45.0  41.1 - 50.3 % Final    MCV 08/23/2022 89.6  79.6 - 97.8 FL Final    MCH 08/23/2022 30.1  26.1 - 32.9 PG Final    MCHC 08/23/2022 33.6  31.4 - 35.0 g/dL Final    RDW 08/23/2022 13.2  11.9 - 14.6 % Final    Platelets 38/70/4680 192  150 - 450 K/uL Final    MPV 08/23/2022 10.7  9.4 - 12.3 FL Final    nRBC 08/23/2022 0.00  0.0 - 0.2 K/uL Final    **Note: Absolute NRBC parameter is now reported with Hemogram**    Sodium 08/23/2022 139  138 - 145 mmol/L Final    Potassium 08/23/2022 4.0  3.5 - 5.1 mmol/L Final    Chloride 08/23/2022 106  98 - 107 mmol/L Final    CO2 08/23/2022 24  21 - 32 mmol/L Final    Anion Gap 08/23/2022 9  7 - 16 mmol/L Final    Glucose 08/23/2022 124 (A) 65 - 100 mg/dL Final    BUN 08/23/2022 14  8 - 23 MG/DL Final    Creatinine 08/23/2022 1.10  0.8 - 1.5 MG/DL Final    GFR  08/23/2022 >60  >60 ml/min/1.73m2 Final    GFR Non- 08/23/2022 >60  >60 ml/min/1.73m2 Final    Comment: Estimated GFR is calculated using the Modification of Diet in Renal Disease (MDRD) Study equation, reported for both  Americans (GFRAA) and non- Americans (GFRNA), and normalized to 1.73m2 body surface area. The physician must decide which value applies to the patient. The MDRD study equation should only be used in individuals age 25 or older. It has not been validated for the following: pregnant women, patients with serious comorbid conditions,or on certain medications, or persons with extremes of body size, muscle mass, or nutritional status. Calcium 08/23/2022 9.5  8.3 - 10.4 MG/DL Final    Total Bilirubin 08/23/2022 1.0  0.2 - 1.1 MG/DL Final    ALT 08/23/2022 56  12 - 65 U/L Final    AST 08/23/2022 48 (A) 15 - 37 U/L Final    Alk Phosphatase 08/23/2022 80  50 - 136 U/L Final    Total Protein 08/23/2022 7.7  6.3 - 8.2 g/dL Final    Albumin 08/23/2022 4.2  3.2 - 4.6 g/dL Final    Globulin 08/23/2022 3.5  2.3 - 3.5 g/dL Final    Albumin/Globulin Ratio 08/23/2022 1.2  1.2 - 3.5   Final    TSH, 3RD GENERATION 08/23/2022 2.880  0.358 - 3.740 uIU/mL Final    PSA 08/23/2022 0.4  <4.0 ng/mL Final    Comment: [employer].   New method in use, please reestablish patient baseline      Cholesterol, Total 08/23/2022 205 (A) <200 MG/DL Final    Comment: Borderline High: 200-239 mg/dL  High: Greater than or equal to 240 mg/dL      Triglycerides 08/23/2022 316 (A) 35 - 150 MG/DL Final    Comment: Borderline High: 150-199 mg/dL, High: 200-499 mg/dL  Very High: Greater than or equal to 500 mg/dL      HDL 08/23/2022 49  40 - 60 MG/DL Final    LDL Calculated 08/23/2022 92.8  <100 MG/DL Final    Comment: Near Optimal: 100-129 mg/dL  Borderline High: 130-159, High: 160-189 mg/dL  Very High: Greater than or equal to 190 mg/dL      VLDL Cholesterol Calculated 08/23/2022 63.2 (A) 6.0 - 23.0 MG/DL Final    Chol/HDL Ratio 08/23/2022 4.2    Final    Color, UA 08/23/2022 DARK YELLOW    Final    Color Reference Range: Straw, Yellow or Dark Yellow    Appearance 08/23/2022 CLEAR    Final    Specific Gravity, UA 08/23/2022 1.019  1.001 - 1.023   Final    pH, Urine 08/23/2022 6.5  5.0 - 9.0   Final    Protein, UA 08/23/2022 Negative  Negative mg/dL Final    Glucose, UA 08/23/2022 Negative  mg/dL Final    Ketones, Urine 08/23/2022 Negative  Negative mg/dL Final    Bilirubin Urine 08/23/2022 Negative  Negative   Final    Blood, Urine 08/23/2022 Negative  Negative   Final    Urobilinogen, Urine 08/23/2022 1.0  0.2 - 1.0 EU/dL Final    Nitrite, Urine 08/23/2022 Negative  Negative   Final    Leukocyte Esterase, Urine 08/23/2022 Negative  Negative   Final    Urine Culture if Indicated 08/23/2022 CULTURE NOT INDICATED BY UA RESULT    Final    WBC, UA 08/23/2022 0-4 (A) NORMAL /hpf Final    RBC, UA 08/23/2022 0-5 (A) NORMAL /hpf Final    BACTERIA, URINE 08/23/2022 Negative (A) NORMAL /hpf Final    Epithelial Cells UA 08/23/2022 0-5 (A) NORMAL /hpf Final    Casts 08/23/2022 2-5 (A) NORMAL /lpf Final    Mucus, UA 08/23/2022 0  0 /lpf Final       ASSESSMENT and PLAN    Healthcare maintenance  Essential (primary) hypertension  Osteoarthritis, unspecified osteoarthritis type, unspecified site  Gastro-esophageal reflux disease without esophagitis  Pure hypercholesterolemia, unspecified  Other chronic pain  The patient was seen today for the annual preventive health visit. The patient was provided anticipatory guidance and counseling regarding age / sex appropriate health maintenance issues including vaccinations, blood pressure screening, lipid screening, cancer screenings, diet, exercise, avoidance of tobacco / substance abuse. Current treatment plan is effective. no changes in therapy  lab results and schedule for future lab studies reviewed with patient  reviewed diet, exercise and weight control   Cardiovascular risk and specific lipid/ LDL goals reviewed    No follow-ups on file.      Vasile Stinson MD

## 2022-09-26 ENCOUNTER — OFFICE VISIT (OUTPATIENT)
Dept: FAMILY MEDICINE CLINIC | Facility: CLINIC | Age: 62
End: 2022-09-26
Payer: COMMERCIAL

## 2022-09-26 VITALS
SYSTOLIC BLOOD PRESSURE: 140 MMHG | TEMPERATURE: 97.2 F | DIASTOLIC BLOOD PRESSURE: 76 MMHG | BODY MASS INDEX: 27.09 KG/M2 | HEIGHT: 72 IN | HEART RATE: 64 BPM | WEIGHT: 200 LBS | OXYGEN SATURATION: 96 %

## 2022-09-26 DIAGNOSIS — E78.00 HYPERCHOLESTEROLEMIA: Primary | ICD-10-CM

## 2022-09-26 DIAGNOSIS — R93.1 ELEVATED CORONARY ARTERY CALCIUM SCORE: ICD-10-CM

## 2022-09-26 PROCEDURE — 99213 OFFICE O/P EST LOW 20 MIN: CPT | Performed by: FAMILY MEDICINE

## 2022-10-16 PROBLEM — E11.9 TYPE 2 DIABETES MELLITUS (HCC): Status: RESOLVED | Noted: 2021-08-26 | Resolved: 2022-10-16

## 2022-10-16 ASSESSMENT — ENCOUNTER SYMPTOMS
GASTROINTESTINAL NEGATIVE: 1
EYES NEGATIVE: 1
RESPIRATORY NEGATIVE: 1

## 2022-10-16 NOTE — PROGRESS NOTES
HISTORY OF PRESENT ILLNESS  Unruly Rivera is a 65 y.o. y.o. male    Hyperlipidemia  This is a recurrent problem. The problem is uncontrolled. Recent lipid tests were reviewed and are high. Coronary Artery Disease  Presents for initial (elevated lora scan of 120 in LAD , asymptomatic) visit. The disease course has been stable. Risk factors include hyperlipidemia. Allergies   Allergen Reactions    Penicillin G Hives        Current Outpatient Medications   Medication Sig    gabapentin (NEURONTIN) 600 MG tablet Take 1 tablet by mouth 2 times daily. omeprazole (PRILOSEC) 20 MG delayed release capsule Take 1 capsule by mouth daily    amLODIPine-benazepril (LOTREL) 5-10 MG per capsule Take 1 capsule by mouth daily     No current facility-administered medications for this visit. Past Medical History:   Diagnosis Date    Arthritis     Chronic pain     C6-C7 bulging disc; Neuro eval. 2010    Colon polyps     Elevated liver function tests 2013    while on fenofibrate    Hypercholesterolemia     Hypertension     Polycythemia 2013    normal erythropoietin level        Past Surgical History:   Procedure Laterality Date    BACK SURGERY  04/2020    L3-4 microdiskectomy    COLONOSCOPY  6/2014, 7/19/18    repeat 5 yrs    HERNIA REPAIR  09/2020    ORTHOPEDIC SURGERY  age 12    fractured leg    ORTHOPEDIC SURGERY  06/2016    right elbow with torn tendons        Social History     Socioeconomic History    Marital status:      Spouse name: Not on file    Number of children: Not on file    Years of education: Not on file    Highest education level: Not on file   Occupational History    Not on file   Tobacco Use    Smoking status: Some Days     Packs/day: 0.10     Types: Cigarettes    Smokeless tobacco: Current    Tobacco comments:     Quit smoking: cigars. Substance and Sexual Activity    Alcohol use:  Yes     Alcohol/week: 6.0 standard drinks    Drug use: No    Sexual activity: Not on file   Other Topics Concern    Not on file   Social History Narrative    Not on file     Social Determinants of Health     Financial Resource Strain: Not on file   Food Insecurity: Not on file   Transportation Needs: Not on file   Physical Activity: Not on file   Stress: Not on file   Social Connections: Not on file   Intimate Partner Violence: Not on file   Housing Stability: Not on file        Review of Systems   Constitutional: Negative. HENT: Negative. Eyes: Negative. Respiratory: Negative. Cardiovascular: Negative. Gastrointestinal: Negative. Endocrine: Negative. Genitourinary: Negative. Skin: Negative. Neurological: Negative. Psychiatric/Behavioral: Negative. BP (!) 140/76   Pulse 64   Temp 97.2 °F (36.2 °C) (Temporal)   Ht 6' (1.829 m)   Wt 200 lb (90.7 kg)   SpO2 96%   BMI 27.12 kg/m²      Physical Exam  Constitutional:       General: He is not in acute distress. Appearance: Normal appearance. HENT:      Head: Normocephalic. Nose: Nose normal.   Eyes:      Conjunctiva/sclera: Conjunctivae normal.   Cardiovascular:      Rate and Rhythm: Normal rate. Pulmonary:      Effort: Pulmonary effort is normal.      Breath sounds: Normal breath sounds. Abdominal:      General: Abdomen is flat. Bowel sounds are normal.      Palpations: Abdomen is soft. Musculoskeletal:         General: Normal range of motion. Cervical back: Normal range of motion. Skin:     General: Skin is warm and dry. Neurological:      General: No focal deficit present. Mental Status: He is alert.    Psychiatric:         Mood and Affect: Mood normal.       Nurse Only on 08/23/2022   Component Date Value Ref Range Status    WBC 08/23/2022 7.0  4.3 - 11.1 K/uL Final    RBC 08/23/2022 5.02  4.23 - 5.6 M/uL Final    Hemoglobin 08/23/2022 15.1  13.6 - 17.2 g/dL Final    Hematocrit 08/23/2022 45.0  41.1 - 50.3 % Final    MCV 08/23/2022 89.6  79.6 - 97.8 FL Final    MCH 08/23/2022 30.1  26.1 - 32.9 PG Final    MCHC 08/23/2022 33.6  31.4 - 35.0 g/dL Final    RDW 08/23/2022 13.2  11.9 - 14.6 % Final    Platelets 44/30/0988 192  150 - 450 K/uL Final    MPV 08/23/2022 10.7  9.4 - 12.3 FL Final    nRBC 08/23/2022 0.00  0.0 - 0.2 K/uL Final    **Note: Absolute NRBC parameter is now reported with Hemogram**    Sodium 08/23/2022 139  138 - 145 mmol/L Final    Potassium 08/23/2022 4.0  3.5 - 5.1 mmol/L Final    Chloride 08/23/2022 106  98 - 107 mmol/L Final    CO2 08/23/2022 24  21 - 32 mmol/L Final    Anion Gap 08/23/2022 9  7 - 16 mmol/L Final    Glucose 08/23/2022 124 (A)  65 - 100 mg/dL Final    BUN 08/23/2022 14  8 - 23 MG/DL Final    Creatinine 08/23/2022 1.10  0.8 - 1.5 MG/DL Final    GFR  08/23/2022 >60  >60 ml/min/1.73m2 Final    GFR Non- 08/23/2022 >60  >60 ml/min/1.73m2 Final    Comment:   Estimated GFR is calculated using the Modification of Diet in Renal Disease (MDRD) Study equation, reported for both  Americans (GFRAA) and non- Americans (GFRNA), and normalized to 1.73m2 body surface area. The physician must decide which value applies to the patient. The MDRD study equation should only be used in individuals age 25 or older. It has not been validated for the following: pregnant women, patients with serious comorbid conditions,or on certain medications, or persons with extremes of body size, muscle mass, or nutritional status.       Calcium 08/23/2022 9.5  8.3 - 10.4 MG/DL Final    Total Bilirubin 08/23/2022 1.0  0.2 - 1.1 MG/DL Final    ALT 08/23/2022 56  12 - 65 U/L Final    AST 08/23/2022 48 (A)  15 - 37 U/L Final    Alk Phosphatase 08/23/2022 80  50 - 136 U/L Final    Total Protein 08/23/2022 7.7  6.3 - 8.2 g/dL Final    Albumin 08/23/2022 4.2  3.2 - 4.6 g/dL Final    Globulin 08/23/2022 3.5  2.3 - 3.5 g/dL Final    Albumin/Globulin Ratio 08/23/2022 1.2  1.2 - 3.5   Final    TSH, 3RD GENERATION 08/23/2022 2.880  0.358 - 3.740 uIU/mL Final    PSA 08/23/2022 0.4  <4.0 ng/mL Final    Comment: [employer].   New method in use, please reestablish patient baseline      Cholesterol, Total 08/23/2022 205 (A)  <200 MG/DL Final    Comment: Borderline High: 200-239 mg/dL  High: Greater than or equal to 240 mg/dL      Triglycerides 08/23/2022 316 (A)  35 - 150 MG/DL Final    Comment: Borderline High: 150-199 mg/dL, High: 200-499 mg/dL  Very High: Greater than or equal to 500 mg/dL      HDL 08/23/2022 49  40 - 60 MG/DL Final    LDL Calculated 08/23/2022 92.8  <100 MG/DL Final    Comment: Near Optimal: 100-129 mg/dL  Borderline High: 130-159, High: 160-189 mg/dL  Very High: Greater than or equal to 190 mg/dL      VLDL Cholesterol Calculated 08/23/2022 63.2 (A)  6.0 - 23.0 MG/DL Final    Chol/HDL Ratio 08/23/2022 4.2    Final    Color, UA 08/23/2022 DARK YELLOW    Final    Color Reference Range: Straw, Yellow or Dark Yellow    Appearance 08/23/2022 CLEAR    Final    Specific Gravity, UA 08/23/2022 1.019  1.001 - 1.023   Final    pH, Urine 08/23/2022 6.5  5.0 - 9.0   Final    Protein, UA 08/23/2022 Negative  Negative mg/dL Final    Glucose, UA 08/23/2022 Negative  mg/dL Final    Ketones, Urine 08/23/2022 Negative  Negative mg/dL Final    Bilirubin Urine 08/23/2022 Negative  Negative   Final    Blood, Urine 08/23/2022 Negative  Negative   Final    Urobilinogen, Urine 08/23/2022 1.0  0.2 - 1.0 EU/dL Final    Nitrite, Urine 08/23/2022 Negative  Negative   Final    Leukocyte Esterase, Urine 08/23/2022 Negative  Negative   Final    Urine Culture if Indicated 08/23/2022 CULTURE NOT INDICATED BY UA RESULT    Final    WBC, UA 08/23/2022 0-4 (A)  NORMAL /hpf Final    RBC, UA 08/23/2022 0-5 (A)  NORMAL /hpf Final    BACTERIA, URINE 08/23/2022 Negative (A)  NORMAL /hpf Final    Epithelial Cells UA 08/23/2022 0-5 (A)  NORMAL /hpf Final    Casts 08/23/2022 2-5 (A)  NORMAL /lpf Final    Mucus, UA 08/23/2022 0  0 /lpf Final       ASSESSMENT and PLAN    Hypercholesterolemia  -     Lipid Panel; Future  Elevated coronary artery calcium score      Current treatment plan is effective. no changes in therapy  the following changes in treatment are made Referral to cardiology to eval per stress thall , recheck lipids if not better then statin   reviewed diet, exercise and weight control   Cardiovascular risk and specific lipid/ LDL goals reviewed  use of aspirin to prevent MI and TIA's discussed    No follow-ups on file.      Darlyn Romero MD

## 2023-02-16 ENCOUNTER — NURSE ONLY (OUTPATIENT)
Dept: FAMILY MEDICINE CLINIC | Facility: CLINIC | Age: 63
End: 2023-02-16

## 2023-02-16 DIAGNOSIS — E78.5 ELEVATED LIPIDS: ICD-10-CM

## 2023-02-16 DIAGNOSIS — E78.00 PURE HYPERCHOLESTEROLEMIA, UNSPECIFIED: ICD-10-CM

## 2023-02-16 DIAGNOSIS — E78.00 PURE HYPERCHOLESTEROLEMIA, UNSPECIFIED: Primary | ICD-10-CM

## 2023-02-16 LAB
CHOLEST SERPL-MCNC: 187 MG/DL
HDLC SERPL-MCNC: 43 MG/DL (ref 40–60)
HDLC SERPL: 4.3
LDLC SERPL CALC-MCNC: 96 MG/DL
TRIGL SERPL-MCNC: 240 MG/DL (ref 35–150)
VLDLC SERPL CALC-MCNC: 48 MG/DL (ref 6–23)

## 2023-02-27 ENCOUNTER — OFFICE VISIT (OUTPATIENT)
Dept: FAMILY MEDICINE CLINIC | Facility: CLINIC | Age: 63
End: 2023-02-27
Payer: COMMERCIAL

## 2023-02-27 VITALS
DIASTOLIC BLOOD PRESSURE: 78 MMHG | OXYGEN SATURATION: 96 % | TEMPERATURE: 97.2 F | HEIGHT: 72 IN | HEART RATE: 76 BPM | WEIGHT: 202 LBS | SYSTOLIC BLOOD PRESSURE: 140 MMHG | BODY MASS INDEX: 27.36 KG/M2

## 2023-02-27 DIAGNOSIS — K21.9 GASTRO-ESOPHAGEAL REFLUX DISEASE WITHOUT ESOPHAGITIS: ICD-10-CM

## 2023-02-27 DIAGNOSIS — R93.1 ELEVATED CORONARY ARTERY CALCIUM SCORE: ICD-10-CM

## 2023-02-27 DIAGNOSIS — I10 ESSENTIAL (PRIMARY) HYPERTENSION: Primary | ICD-10-CM

## 2023-02-27 PROCEDURE — 3077F SYST BP >= 140 MM HG: CPT | Performed by: FAMILY MEDICINE

## 2023-02-27 PROCEDURE — 3078F DIAST BP <80 MM HG: CPT | Performed by: FAMILY MEDICINE

## 2023-02-27 PROCEDURE — 99213 OFFICE O/P EST LOW 20 MIN: CPT | Performed by: FAMILY MEDICINE

## 2023-02-27 SDOH — ECONOMIC STABILITY: FOOD INSECURITY: WITHIN THE PAST 12 MONTHS, THE FOOD YOU BOUGHT JUST DIDN'T LAST AND YOU DIDN'T HAVE MONEY TO GET MORE.: NEVER TRUE

## 2023-02-27 SDOH — ECONOMIC STABILITY: INCOME INSECURITY: HOW HARD IS IT FOR YOU TO PAY FOR THE VERY BASICS LIKE FOOD, HOUSING, MEDICAL CARE, AND HEATING?: NOT VERY HARD

## 2023-02-27 SDOH — ECONOMIC STABILITY: TRANSPORTATION INSECURITY
IN THE PAST 12 MONTHS, HAS LACK OF TRANSPORTATION KEPT YOU FROM MEETINGS, WORK, OR FROM GETTING THINGS NEEDED FOR DAILY LIVING?: NO

## 2023-02-27 SDOH — ECONOMIC STABILITY: FOOD INSECURITY: WITHIN THE PAST 12 MONTHS, YOU WORRIED THAT YOUR FOOD WOULD RUN OUT BEFORE YOU GOT MONEY TO BUY MORE.: NEVER TRUE

## 2023-02-27 SDOH — ECONOMIC STABILITY: HOUSING INSECURITY
IN THE LAST 12 MONTHS, WAS THERE A TIME WHEN YOU DID NOT HAVE A STEADY PLACE TO SLEEP OR SLEPT IN A SHELTER (INCLUDING NOW)?: NO

## 2023-02-27 ASSESSMENT — ENCOUNTER SYMPTOMS
EYES NEGATIVE: 1
RESPIRATORY NEGATIVE: 1
GASTROINTESTINAL NEGATIVE: 1

## 2023-02-27 ASSESSMENT — PATIENT HEALTH QUESTIONNAIRE - PHQ9
SUM OF ALL RESPONSES TO PHQ QUESTIONS 1-9: 0
2. FEELING DOWN, DEPRESSED OR HOPELESS: 0
1. LITTLE INTEREST OR PLEASURE IN DOING THINGS: 0
SUM OF ALL RESPONSES TO PHQ QUESTIONS 1-9: 0
SUM OF ALL RESPONSES TO PHQ9 QUESTIONS 1 & 2: 0

## 2023-02-27 NOTE — PROGRESS NOTES
HISTORY OF PRESENT ILLNESS  Vishnu Chiu is a 62 y.o. y.o. male    Hypertension  This is a recurrent problem. The problem is unchanged.     Allergies   Allergen Reactions    Penicillin G Hives        Current Outpatient Medications   Medication Sig    gabapentin (NEURONTIN) 600 MG tablet Take 1 tablet by mouth 2 times daily.    omeprazole (PRILOSEC) 20 MG delayed release capsule Take 1 capsule by mouth daily    amLODIPine-benazepril (LOTREL) 5-10 MG per capsule Take 1 capsule by mouth daily     No current facility-administered medications for this visit.        Past Medical History:   Diagnosis Date    Arthritis     Chronic pain     C6-C7 bulging disc; Neuro eval. 2010    Colon polyps     Elevated liver function tests 2013    while on fenofibrate    Hypercholesterolemia     Hypertension     Polycythemia 2013    normal erythropoietin level        Past Surgical History:   Procedure Laterality Date    BACK SURGERY  04/2020    L3-4 microdiskectomy    COLONOSCOPY  6/2014, 7/19/18    repeat 5 yrs    HERNIA REPAIR  09/2020    ORTHOPEDIC SURGERY  age 6    fractured leg    ORTHOPEDIC SURGERY  06/2016    right elbow with torn tendons        Social History     Socioeconomic History    Marital status:      Spouse name: Not on file    Number of children: Not on file    Years of education: Not on file    Highest education level: Not on file   Occupational History    Not on file   Tobacco Use    Smoking status: Some Days     Packs/day: 0.10     Types: Cigarettes    Smokeless tobacco: Current    Tobacco comments:     Quit smoking: cigars.   Substance and Sexual Activity    Alcohol use: Yes     Alcohol/week: 6.0 standard drinks    Drug use: No    Sexual activity: Not on file   Other Topics Concern    Not on file   Social History Narrative    Not on file     Social Determinants of Health     Financial Resource Strain: Low Risk     Difficulty of Paying Living Expenses: Not very hard   Food Insecurity: No Food Insecurity    Worried  About Running Out of Food in the Last Year: Never true    Ran Out of Food in the Last Year: Never true   Transportation Needs: Unknown    Lack of Transportation (Medical): Not on file    Lack of Transportation (Non-Medical): No   Physical Activity: Not on file   Stress: Not on file   Social Connections: Not on file   Intimate Partner Violence: Not on file   Housing Stability: Unknown    Unable to Pay for Housing in the Last Year: Not on file    Number of Places Lived in the Last Year: Not on file    Unstable Housing in the Last Year: No        Review of Systems   Constitutional: Negative. HENT: Negative. Eyes: Negative. Respiratory: Negative. Cardiovascular: Negative. Gastrointestinal: Negative. Endocrine: Negative. Genitourinary: Negative. Skin: Negative. Neurological: Negative. Psychiatric/Behavioral: Negative. BP (!) 160/78   Pulse 76   Temp 97.2 °F (36.2 °C) (Temporal)   Ht 6' (1.829 m)   Wt 202 lb (91.6 kg)   SpO2 96%   BMI 27.40 kg/m²      Physical Exam  Constitutional:       General: He is not in acute distress. Appearance: Normal appearance. HENT:      Head: Normocephalic. Nose: Nose normal.   Eyes:      Conjunctiva/sclera: Conjunctivae normal.   Cardiovascular:      Rate and Rhythm: Normal rate. Pulmonary:      Effort: Pulmonary effort is normal.      Breath sounds: Normal breath sounds. Abdominal:      General: Abdomen is flat. Bowel sounds are normal.      Palpations: Abdomen is soft. Musculoskeletal:         General: Normal range of motion. Cervical back: Normal range of motion. Skin:     General: Skin is warm and dry. Neurological:      General: No focal deficit present. Mental Status: He is alert.    Psychiatric:         Mood and Affect: Mood normal.       Nurse Only on 02/16/2023   Component Date Value Ref Range Status    Cholesterol, Total 02/16/2023 187  <200 MG/DL Final    Comment: Borderline High: 200-239 mg/dL  High: Greater than or equal to 240 mg/dL      Triglycerides 02/16/2023 240 (A)  35 - 150 MG/DL Final    Comment: Borderline High: 150-199 mg/dL, High: 200-499 mg/dL  Very High: Greater than or equal to 500 mg/dL      HDL 02/16/2023 43  40 - 60 MG/DL Final    LDL Calculated 02/16/2023 96  <100 MG/DL Final    Comment: Near Optimal: 100-129 mg/dL  Borderline High: 130-159, High: 160-189 mg/dL  Very High: Greater than or equal to 190 mg/dL      VLDL Cholesterol Calculated 02/16/2023 48 (A)  6.0 - 23.0 MG/DL Final    Chol/HDL Ratio 02/16/2023 4.3    Final       ASSESSMENT and PLAN    Essential (primary) hypertension  Elevated coronary artery calcium score  Gastro-esophageal reflux disease without esophagitis      Current treatment plan is effective. no changes in therapy  lab results and schedule for future lab studies reviewed with patient  reviewed diet, exercise and weight control   Cardiovascular risk and specific lipid/ LDL goals reviewed    No follow-ups on file.      Shruthi Arguello MD

## 2023-04-24 ENCOUNTER — APPOINTMENT (RX ONLY)
Dept: URBAN - METROPOLITAN AREA CLINIC 330 | Facility: CLINIC | Age: 63
Setting detail: DERMATOLOGY
End: 2023-04-24

## 2023-04-24 DIAGNOSIS — L57.8 OTHER SKIN CHANGES DUE TO CHRONIC EXPOSURE TO NONIONIZING RADIATION: ICD-10-CM

## 2023-04-24 DIAGNOSIS — L57.0 ACTINIC KERATOSIS: ICD-10-CM

## 2023-04-24 DIAGNOSIS — D22 MELANOCYTIC NEVI: ICD-10-CM | Status: STABLE

## 2023-04-24 DIAGNOSIS — L82.1 OTHER SEBORRHEIC KERATOSIS: ICD-10-CM

## 2023-04-24 PROBLEM — D22.5 MELANOCYTIC NEVI OF TRUNK: Status: ACTIVE | Noted: 2023-04-24

## 2023-04-24 PROCEDURE — ? LIQUID NITROGEN

## 2023-04-24 PROCEDURE — ? TREATMENT REGIMEN

## 2023-04-24 PROCEDURE — ? COUNSELING

## 2023-04-24 PROCEDURE — ? FULL BODY SKIN EXAM

## 2023-04-24 PROCEDURE — ? OTHER

## 2023-04-24 PROCEDURE — 17003 DESTRUCT PREMALG LES 2-14: CPT

## 2023-04-24 PROCEDURE — 17000 DESTRUCT PREMALG LESION: CPT

## 2023-04-24 PROCEDURE — 99213 OFFICE O/P EST LOW 20 MIN: CPT | Mod: 25

## 2023-04-24 ASSESSMENT — LOCATION ZONE DERM
LOCATION ZONE: SCALP
LOCATION ZONE: LEG
LOCATION ZONE: TRUNK
LOCATION ZONE: FACE

## 2023-04-24 ASSESSMENT — LOCATION SIMPLE DESCRIPTION DERM
LOCATION SIMPLE: UPPER BACK
LOCATION SIMPLE: LEFT SCALP
LOCATION SIMPLE: RIGHT THIGH
LOCATION SIMPLE: LEFT UPPER BACK
LOCATION SIMPLE: RIGHT CHEEK

## 2023-04-24 ASSESSMENT — LOCATION DETAILED DESCRIPTION DERM
LOCATION DETAILED: LEFT MEDIAL FRONTAL SCALP
LOCATION DETAILED: RIGHT INFERIOR LATERAL MALAR CHEEK
LOCATION DETAILED: RIGHT ANTERIOR DISTAL THIGH
LOCATION DETAILED: LEFT SUPERIOR MEDIAL UPPER BACK
LOCATION DETAILED: SUPERIOR THORACIC SPINE

## 2023-04-24 NOTE — PROCEDURE: LIQUID NITROGEN
Consent: The patient's consent was obtained including but not limited to risks of crusting, scabbing, blistering, scarring, darker or lighter pigmentary change, recurrence, incomplete removal and infection.
Render Post-Care Instructions In Note?: no
Post-Care Instructions: I reviewed with the patient in detail post-care instructions. Patient is to wear sunprotection, and avoid picking at any of the treated lesions. Pt may apply Vaseline to crusted or scabbing areas.
Number Of Freeze-Thaw Cycles: 3 freeze-thaw cycles
Show Aperture Variable?: Yes
Detail Level: Detailed
Duration Of Freeze Thaw-Cycle (Seconds): 2

## 2023-04-24 NOTE — PROCEDURE: OTHER
Render Risk Assessment In Note?: yes
Detail Level: Generalized
Note Text (......Xxx Chief Complaint.): This diagnosis correlates with the
Other (Free Text): Patient consent was obtained to proceed with the visit and recommended plan of care after discussion of all risks and benefits, including the risks of COVID-19 exposure.

## 2023-08-24 ENCOUNTER — NURSE ONLY (OUTPATIENT)
Dept: FAMILY MEDICINE CLINIC | Facility: CLINIC | Age: 63
End: 2023-08-24

## 2023-08-24 DIAGNOSIS — E78.5 ELEVATED LIPIDS: ICD-10-CM

## 2023-08-24 DIAGNOSIS — Z12.5 SCREENING PSA (PROSTATE SPECIFIC ANTIGEN): ICD-10-CM

## 2023-08-24 DIAGNOSIS — I10 ESSENTIAL (PRIMARY) HYPERTENSION: ICD-10-CM

## 2023-08-24 DIAGNOSIS — E78.00 PURE HYPERCHOLESTEROLEMIA, UNSPECIFIED: ICD-10-CM

## 2023-08-24 DIAGNOSIS — Z00.00 ENCOUNTER FOR ANNUAL PHYSICAL EXAM: Primary | ICD-10-CM

## 2023-08-24 DIAGNOSIS — Z00.00 ENCOUNTER FOR ANNUAL PHYSICAL EXAM: ICD-10-CM

## 2023-08-24 LAB
APPEARANCE UR: CLEAR
BACTERIA URNS QL MICRO: NEGATIVE /HPF
BASOPHILS # BLD: 0.1 K/UL (ref 0–0.2)
BASOPHILS NFR BLD: 1 % (ref 0–2)
BILIRUB UR QL: NEGATIVE
CASTS URNS QL MICRO: NORMAL /LPF (ref 0–2)
CHOLEST SERPL-MCNC: 191 MG/DL
COLOR UR: NORMAL
DIFFERENTIAL METHOD BLD: ABNORMAL
EOSINOPHIL # BLD: 0.5 K/UL (ref 0–0.8)
EOSINOPHIL NFR BLD: 8 % (ref 0.5–7.8)
EPI CELLS #/AREA URNS HPF: NORMAL /HPF (ref 0–5)
ERYTHROCYTE [DISTWIDTH] IN BLOOD BY AUTOMATED COUNT: 16.6 % (ref 11.9–14.6)
GLUCOSE UR STRIP.AUTO-MCNC: NEGATIVE MG/DL
HCT VFR BLD AUTO: 34.1 % (ref 41.1–50.3)
HDLC SERPL-MCNC: 46 MG/DL (ref 40–60)
HDLC SERPL: 4.2
HGB BLD-MCNC: 10.1 G/DL (ref 13.6–17.2)
HGB UR QL STRIP: NEGATIVE
IMM GRANULOCYTES # BLD AUTO: 0 K/UL (ref 0–0.5)
IMM GRANULOCYTES NFR BLD AUTO: 0 % (ref 0–5)
KETONES UR QL STRIP.AUTO: NEGATIVE MG/DL
LDLC SERPL CALC-MCNC: 102.6 MG/DL
LEUKOCYTE ESTERASE UR QL STRIP.AUTO: NEGATIVE
LYMPHOCYTES # BLD: 1.8 K/UL (ref 0.5–4.6)
LYMPHOCYTES NFR BLD: 31 % (ref 13–44)
MCH RBC QN AUTO: 21.8 PG (ref 26.1–32.9)
MCHC RBC AUTO-ENTMCNC: 29.6 G/DL (ref 31.4–35)
MCV RBC AUTO: 73.5 FL (ref 82–102)
MONOCYTES # BLD: 0.8 K/UL (ref 0.1–1.3)
MONOCYTES NFR BLD: 13 % (ref 4–12)
MUCOUS THREADS URNS QL MICRO: 0 /LPF
NEUTS SEG # BLD: 2.6 K/UL (ref 1.7–8.2)
NEUTS SEG NFR BLD: 46 % (ref 43–78)
NITRITE UR QL STRIP.AUTO: NEGATIVE
NRBC # BLD: 0 K/UL (ref 0–0.2)
PH UR STRIP: 6 (ref 5–9)
PLATELET # BLD AUTO: 212 K/UL (ref 150–450)
PMV BLD AUTO: 10.1 FL (ref 9.4–12.3)
PROT UR STRIP-MCNC: NEGATIVE MG/DL
PSA SERPL-MCNC: 0.4 NG/ML
RBC # BLD AUTO: 4.64 M/UL (ref 4.23–5.6)
RBC #/AREA URNS HPF: NORMAL /HPF (ref 0–5)
SP GR UR REFRACTOMETRY: 1.02 (ref 1–1.02)
TRIGL SERPL-MCNC: 212 MG/DL (ref 35–150)
TSH, 3RD GENERATION: 3.43 UIU/ML (ref 0.36–3.74)
URINE CULTURE IF INDICATED: NORMAL
UROBILINOGEN UR QL STRIP.AUTO: 0.2 EU/DL (ref 0.2–1)
VLDLC SERPL CALC-MCNC: 42.4 MG/DL (ref 6–23)
WBC # BLD AUTO: 5.7 K/UL (ref 4.3–11.1)
WBC URNS QL MICRO: NORMAL /HPF (ref 0–4)

## 2023-08-24 RX ORDER — AMLODIPINE BESYLATE AND BENAZEPRIL HYDROCHLORIDE 5; 10 MG/1; MG/1
CAPSULE ORAL
Qty: 90 CAPSULE | Refills: 3 | Status: SHIPPED | OUTPATIENT
Start: 2023-08-24

## 2023-08-24 RX ORDER — OMEPRAZOLE 20 MG/1
20 CAPSULE, DELAYED RELEASE ORAL DAILY
Qty: 90 CAPSULE | Refills: 3 | Status: SHIPPED | OUTPATIENT
Start: 2023-08-24

## 2023-08-24 RX ORDER — GABAPENTIN 600 MG/1
600 TABLET ORAL 2 TIMES DAILY
Qty: 180 TABLET | Refills: 3 | Status: SHIPPED | OUTPATIENT
Start: 2023-08-24 | End: 2024-08-23

## 2023-08-24 NOTE — TELEPHONE ENCOUNTER
Refill:nuerontin  Dosage: 600 mg  Freq: Take 1 tablet by mouth 2 times daily. To: [facility] on [address]    Refill:Prilosec  Dosage: 20 mg delayed release  Freq: Take 1 capsule by mouth daily  To:     Refill:Lotrel  Dosage: 5-10 mg  Freq: Take 1 capsule by mouth daily  To:     Pt will run out of meds over the weekend and is scheduled for cpx on 8/31

## 2023-08-25 LAB
ALBUMIN SERPL-MCNC: 4.1 G/DL (ref 3.2–4.6)
ALBUMIN/GLOB SERPL: 1.2 (ref 0.4–1.6)
ALP SERPL-CCNC: 63 U/L (ref 50–136)
ALT SERPL-CCNC: 59 U/L (ref 12–65)
ANION GAP SERPL CALC-SCNC: 4 MMOL/L (ref 2–11)
AST SERPL-CCNC: 44 U/L (ref 15–37)
BILIRUB SERPL-MCNC: 1 MG/DL (ref 0.2–1.1)
BUN SERPL-MCNC: 19 MG/DL (ref 8–23)
CALCIUM SERPL-MCNC: 8.9 MG/DL (ref 8.3–10.4)
CHLORIDE SERPL-SCNC: 108 MMOL/L (ref 101–110)
CO2 SERPL-SCNC: 26 MMOL/L (ref 21–32)
CREAT SERPL-MCNC: 1.2 MG/DL (ref 0.8–1.5)
GLOBULIN SER CALC-MCNC: 3.3 G/DL (ref 2.8–4.5)
GLUCOSE SERPL-MCNC: 97 MG/DL (ref 65–100)
POTASSIUM SERPL-SCNC: 4.2 MMOL/L (ref 3.5–5.1)
PROT SERPL-MCNC: 7.4 G/DL (ref 6.3–8.2)
SODIUM SERPL-SCNC: 138 MMOL/L (ref 133–143)

## 2023-08-28 ASSESSMENT — PATIENT HEALTH QUESTIONNAIRE - PHQ9
SUM OF ALL RESPONSES TO PHQ QUESTIONS 1-9: 0
2. FEELING DOWN, DEPRESSED OR HOPELESS: 0
SUM OF ALL RESPONSES TO PHQ9 QUESTIONS 1 & 2: 0
SUM OF ALL RESPONSES TO PHQ QUESTIONS 1-9: 0
SUM OF ALL RESPONSES TO PHQ9 QUESTIONS 1 & 2: 0
SUM OF ALL RESPONSES TO PHQ QUESTIONS 1-9: 0
1. LITTLE INTEREST OR PLEASURE IN DOING THINGS: NOT AT ALL
1. LITTLE INTEREST OR PLEASURE IN DOING THINGS: 0
SUM OF ALL RESPONSES TO PHQ QUESTIONS 1-9: 0
2. FEELING DOWN, DEPRESSED OR HOPELESS: NOT AT ALL

## 2023-08-31 ENCOUNTER — OFFICE VISIT (OUTPATIENT)
Dept: FAMILY MEDICINE CLINIC | Facility: CLINIC | Age: 63
End: 2023-08-31

## 2023-08-31 VITALS
SYSTOLIC BLOOD PRESSURE: 138 MMHG | TEMPERATURE: 97.2 F | HEIGHT: 72 IN | HEART RATE: 61 BPM | WEIGHT: 202 LBS | OXYGEN SATURATION: 97 % | BODY MASS INDEX: 27.36 KG/M2 | DIASTOLIC BLOOD PRESSURE: 82 MMHG

## 2023-08-31 DIAGNOSIS — I10 HYPERTENSION, UNSPECIFIED TYPE: ICD-10-CM

## 2023-08-31 DIAGNOSIS — G60.9 IDIOPATHIC PERIPHERAL NEUROPATHY: ICD-10-CM

## 2023-08-31 DIAGNOSIS — Z12.11 SCREENING FOR COLON CANCER: Primary | ICD-10-CM

## 2023-08-31 DIAGNOSIS — R93.1 ELEVATED CORONARY ARTERY CALCIUM SCORE: ICD-10-CM

## 2023-08-31 DIAGNOSIS — Z00.00 ENCOUNTER FOR ANNUAL PHYSICAL EXAM: ICD-10-CM

## 2023-08-31 DIAGNOSIS — R26.89 BALANCE PROBLEM: ICD-10-CM

## 2023-08-31 DIAGNOSIS — K21.9 GASTRO-ESOPHAGEAL REFLUX DISEASE WITHOUT ESOPHAGITIS: ICD-10-CM

## 2023-08-31 DIAGNOSIS — E78.00 HYPERCHOLESTEROLEMIA: ICD-10-CM

## 2023-08-31 DIAGNOSIS — M19.90 OSTEOARTHRITIS, UNSPECIFIED OSTEOARTHRITIS TYPE, UNSPECIFIED SITE: ICD-10-CM

## 2023-08-31 LAB
HEMOCCULT STL QL: NEGATIVE
VALID INTERNAL CONTROL: YES

## 2023-09-09 ASSESSMENT — ENCOUNTER SYMPTOMS
RESPIRATORY NEGATIVE: 1
EYES NEGATIVE: 1
HEARTBURN: 1

## 2023-09-15 ENCOUNTER — CLINICAL DOCUMENTATION (OUTPATIENT)
Dept: SURGERY | Age: 63
End: 2023-09-15

## 2023-09-15 NOTE — PROGRESS NOTES
Referral received for routine colonoscopy- screening or surveillance only. Chart reviewed by me on September 15, 2023. Pt had negative exam by JACK Wolff), with EGD, for anemia in 2018    HISTORY INDICATES PT WITH FIRST COUSIN AFFECTED BY COLON CANCER AT YOUNG AGE  [facility] GUIDELINES CLEARLY STATE THAT THIS IS NOT AN INDICATION FOR A HIGH RISK INTERVAL FOR THIS PATIENT.   THEREFORE, HE IS NOT DUE FOR COLONOSCOPY FOR SCREENING UNTIL NO SOONER THAN 7/2028  FURTHERMORE, ANNUAL OCCULT BLOOD TESTING IS REDUNDANT SCREENING FOR CRC AND IS NOT INDICATED

## 2024-04-25 ENCOUNTER — APPOINTMENT (RX ONLY)
Dept: URBAN - METROPOLITAN AREA CLINIC 330 | Facility: CLINIC | Age: 64
Setting detail: DERMATOLOGY
End: 2024-04-25

## 2024-04-25 DIAGNOSIS — L91.8 OTHER HYPERTROPHIC DISORDERS OF THE SKIN: ICD-10-CM

## 2024-04-25 DIAGNOSIS — L82.1 OTHER SEBORRHEIC KERATOSIS: ICD-10-CM

## 2024-04-25 DIAGNOSIS — L57.8 OTHER SKIN CHANGES DUE TO CHRONIC EXPOSURE TO NONIONIZING RADIATION: ICD-10-CM

## 2024-04-25 DIAGNOSIS — D22 MELANOCYTIC NEVI: ICD-10-CM | Status: STABLE

## 2024-04-25 PROBLEM — D22.5 MELANOCYTIC NEVI OF TRUNK: Status: ACTIVE | Noted: 2024-04-25

## 2024-04-25 PROBLEM — D22.71 MELANOCYTIC NEVI OF RIGHT LOWER LIMB, INCLUDING HIP: Status: ACTIVE | Noted: 2024-04-25

## 2024-04-25 PROCEDURE — ? BODY PHOTOGRAPHY

## 2024-04-25 PROCEDURE — ? OTHER

## 2024-04-25 PROCEDURE — ? COUNSELING

## 2024-04-25 PROCEDURE — ? MEDICAL PHOTOGRAPHY REVIEW

## 2024-04-25 PROCEDURE — 99213 OFFICE O/P EST LOW 20 MIN: CPT

## 2024-04-25 PROCEDURE — ? FULL BODY SKIN EXAM

## 2024-04-25 PROCEDURE — ? SKIN TAG REMOVAL (COSMETIC)

## 2024-04-25 PROCEDURE — ? TREATMENT REGIMEN

## 2024-04-25 ASSESSMENT — LOCATION DETAILED DESCRIPTION DERM
LOCATION DETAILED: RIGHT CLAVICULAR NECK
LOCATION DETAILED: LEFT INFERIOR ANTERIOR NECK
LOCATION DETAILED: RIGHT SUPERIOR ANTERIOR NECK
LOCATION DETAILED: RIGHT CENTRAL POSTERIOR NECK
LOCATION DETAILED: LEFT SUPERIOR MEDIAL UPPER BACK
LOCATION DETAILED: RIGHT MEDIAL UPPER BACK
LOCATION DETAILED: RIGHT DISTAL PRETIBIAL REGION
LOCATION DETAILED: LEFT CENTRAL LATERAL NECK
LOCATION DETAILED: LEFT INFERIOR LATERAL NECK
LOCATION DETAILED: RIGHT MEDIAL FRONTAL SCALP
LOCATION DETAILED: RIGHT INFERIOR LATERAL NECK
LOCATION DETAILED: RIGHT INFERIOR MEDIAL UPPER BACK

## 2024-04-25 ASSESSMENT — LOCATION ZONE DERM
LOCATION ZONE: TRUNK
LOCATION ZONE: SCALP
LOCATION ZONE: NECK
LOCATION ZONE: LEG

## 2024-04-25 ASSESSMENT — LOCATION SIMPLE DESCRIPTION DERM
LOCATION SIMPLE: RIGHT SCALP
LOCATION SIMPLE: RIGHT ANTERIOR NECK
LOCATION SIMPLE: RIGHT PRETIBIAL REGION
LOCATION SIMPLE: LEFT ANTERIOR NECK
LOCATION SIMPLE: RIGHT UPPER BACK
LOCATION SIMPLE: NECK
LOCATION SIMPLE: LEFT UPPER BACK

## 2024-04-25 NOTE — PROCEDURE: SKIN TAG REMOVAL (COSMETIC)
Price (Use Numbers Only, No Special Characters Or $): 115
Consent: Written consent obtained and the risks of skin tag removal was reviewed with the patient including but not limited to bleeding, pigmentary change, infection, pain, and remote possibility of scarring.
Detail Level: Detailed
Removed With: gradle excision

## 2024-04-25 NOTE — PROCEDURE: BODY PHOTOGRAPHY
Reason For Photography: The patient is obtaining whole body photography to observe existing suspicious moles and or monitor for the appearance of any new lesions.
Was The Entire Body Photographed (Cannot Bill Unless Entire Body Photographed)?: Please Select Yes or No - If you select Yes you are confirming that you took full body photographs
Consent was obtained for whole body photography. Patient understands that photograph costs may not be covered by insurance.
Number Of Photographs (Optional- Will Not Render If 0): 1
Detail Level: Detailed
Whole Body Statement: The whole body was photographed today.

## 2024-04-25 NOTE — PROCEDURE: OTHER
Render Risk Assessment In Note?: yes
Detail Level: Generalized
Note Text (......Xxx Chief Complaint.): This diagnosis correlates with the
Other (Free Text): Patient consent was obtained to proceed with the visit and recommended plan of care after discussion of all risks and benefits, including the risks of COVID-19 exposure.
Detail Level: Simple
Other (Free Text): Discussed cosmetic removal, quoted patient $115 for removal of 1-15 lesions. Pt would like removal today.

## 2024-08-28 ENCOUNTER — LAB (OUTPATIENT)
Dept: FAMILY MEDICINE CLINIC | Facility: CLINIC | Age: 64
End: 2024-08-28

## 2024-08-28 DIAGNOSIS — I10 HYPERTENSION, UNSPECIFIED TYPE: ICD-10-CM

## 2024-08-28 DIAGNOSIS — E78.5 ELEVATED LIPIDS: ICD-10-CM

## 2024-08-28 DIAGNOSIS — Z12.5 SCREENING PSA (PROSTATE SPECIFIC ANTIGEN): ICD-10-CM

## 2024-08-28 DIAGNOSIS — E78.00 HYPERCHOLESTEROLEMIA: ICD-10-CM

## 2024-08-28 DIAGNOSIS — Z00.00 ENCOUNTER FOR ANNUAL PHYSICAL EXAM: Primary | ICD-10-CM

## 2024-08-28 DIAGNOSIS — Z00.00 ENCOUNTER FOR ANNUAL PHYSICAL EXAM: ICD-10-CM

## 2024-08-28 LAB
ALBUMIN SERPL-MCNC: 3.9 G/DL (ref 3.2–4.6)
ALBUMIN/GLOB SERPL: 1.2 (ref 1–1.9)
ALP SERPL-CCNC: 62 U/L (ref 40–129)
ALT SERPL-CCNC: 37 U/L (ref 12–65)
ANION GAP SERPL CALC-SCNC: 11 MMOL/L (ref 9–18)
APPEARANCE UR: CLEAR
AST SERPL-CCNC: 46 U/L (ref 15–37)
BACTERIA URNS QL MICRO: NEGATIVE /HPF
BASOPHILS # BLD: 0.1 K/UL (ref 0–0.2)
BASOPHILS NFR BLD: 1 % (ref 0–2)
BILIRUB SERPL-MCNC: 0.6 MG/DL (ref 0–1.2)
BILIRUB UR QL: NEGATIVE
BUN SERPL-MCNC: 17 MG/DL (ref 8–23)
CALCIUM SERPL-MCNC: 9.2 MG/DL (ref 8.8–10.2)
CASTS URNS QL MICRO: 0 /LPF
CHLORIDE SERPL-SCNC: 103 MMOL/L (ref 98–107)
CHOLEST SERPL-MCNC: 207 MG/DL (ref 0–200)
CO2 SERPL-SCNC: 24 MMOL/L (ref 20–28)
COLOR UR: NORMAL
CREAT SERPL-MCNC: 1.1 MG/DL (ref 0.8–1.3)
CRYSTALS URNS QL MICRO: 0 /LPF
DIFFERENTIAL METHOD BLD: ABNORMAL
EOSINOPHIL # BLD: 0.3 K/UL (ref 0–0.8)
EOSINOPHIL NFR BLD: 4 % (ref 0.5–7.8)
EPI CELLS #/AREA URNS HPF: NORMAL /HPF (ref 0–5)
ERYTHROCYTE [DISTWIDTH] IN BLOOD BY AUTOMATED COUNT: 17.6 % (ref 11.9–14.6)
GLOBULIN SER CALC-MCNC: 3.3 G/DL (ref 2.3–3.5)
GLUCOSE SERPL-MCNC: 116 MG/DL (ref 70–99)
GLUCOSE UR STRIP.AUTO-MCNC: NEGATIVE MG/DL
HCT VFR BLD AUTO: 41.1 % (ref 41.1–50.3)
HDLC SERPL-MCNC: 49 MG/DL (ref 40–60)
HDLC SERPL: 4.2 (ref 0–5)
HGB BLD-MCNC: 12.8 G/DL (ref 13.6–17.2)
HGB UR QL STRIP: NEGATIVE
HYALINE CASTS URNS QL MICRO: NORMAL /LPF
IMM GRANULOCYTES # BLD AUTO: 0 K/UL (ref 0–0.5)
IMM GRANULOCYTES NFR BLD AUTO: 0 % (ref 0–5)
KETONES UR QL STRIP.AUTO: NEGATIVE MG/DL
LDLC SERPL CALC-MCNC: 105 MG/DL (ref 0–100)
LEUKOCYTE ESTERASE UR QL STRIP.AUTO: NEGATIVE
LYMPHOCYTES # BLD: 2.1 K/UL (ref 0.5–4.6)
LYMPHOCYTES NFR BLD: 34 % (ref 13–44)
MCH RBC QN AUTO: 24 PG (ref 26.1–32.9)
MCHC RBC AUTO-ENTMCNC: 31.1 G/DL (ref 31.4–35)
MCV RBC AUTO: 77.1 FL (ref 82–102)
MONOCYTES # BLD: 0.9 K/UL (ref 0.1–1.3)
MONOCYTES NFR BLD: 14 % (ref 4–12)
MUCOUS THREADS URNS QL MICRO: 0 /LPF
NEUTS SEG # BLD: 2.8 K/UL (ref 1.7–8.2)
NEUTS SEG NFR BLD: 47 % (ref 43–78)
NITRITE UR QL STRIP.AUTO: NEGATIVE
NRBC # BLD: 0 K/UL (ref 0–0.2)
PH UR STRIP: 5.5 (ref 5–9)
PLATELET # BLD AUTO: 191 K/UL (ref 150–450)
PMV BLD AUTO: 10.3 FL (ref 9.4–12.3)
POTASSIUM SERPL-SCNC: 4.6 MMOL/L (ref 3.5–5.1)
PROT SERPL-MCNC: 7.2 G/DL (ref 6.3–8.2)
PROT UR STRIP-MCNC: NEGATIVE MG/DL
PSA SERPL-MCNC: 0.4 NG/ML (ref 0–4)
RBC # BLD AUTO: 5.33 M/UL (ref 4.23–5.6)
RBC #/AREA URNS HPF: NORMAL /HPF (ref 0–5)
SODIUM SERPL-SCNC: 139 MMOL/L (ref 136–145)
SP GR UR REFRACTOMETRY: 1.02 (ref 1–1.02)
TRIGL SERPL-MCNC: 263 MG/DL (ref 0–150)
TSH, 3RD GENERATION: 2.37 UIU/ML (ref 0.27–4.2)
URINE CULTURE IF INDICATED: NORMAL
UROBILINOGEN UR QL STRIP.AUTO: 0.2 EU/DL (ref 0.2–1)
VLDLC SERPL CALC-MCNC: 53 MG/DL (ref 6–23)
WBC # BLD AUTO: 6.1 K/UL (ref 4.3–11.1)
WBC URNS QL MICRO: NORMAL /HPF (ref 0–4)

## 2024-09-04 ENCOUNTER — OFFICE VISIT (OUTPATIENT)
Dept: FAMILY MEDICINE CLINIC | Facility: CLINIC | Age: 64
End: 2024-09-04
Payer: COMMERCIAL

## 2024-09-04 VITALS
OXYGEN SATURATION: 97 % | BODY MASS INDEX: 27.77 KG/M2 | WEIGHT: 205 LBS | TEMPERATURE: 97.2 F | HEIGHT: 72 IN | DIASTOLIC BLOOD PRESSURE: 72 MMHG | SYSTOLIC BLOOD PRESSURE: 160 MMHG | HEART RATE: 78 BPM

## 2024-09-04 DIAGNOSIS — K21.9 GASTRO-ESOPHAGEAL REFLUX DISEASE WITHOUT ESOPHAGITIS: ICD-10-CM

## 2024-09-04 DIAGNOSIS — Z00.00 ENCOUNTER FOR ANNUAL PHYSICAL EXAM: Primary | ICD-10-CM

## 2024-09-04 DIAGNOSIS — I10 HYPERTENSION, UNSPECIFIED TYPE: ICD-10-CM

## 2024-09-04 DIAGNOSIS — R93.1 ELEVATED CORONARY ARTERY CALCIUM SCORE: ICD-10-CM

## 2024-09-04 DIAGNOSIS — E78.00 HYPERCHOLESTEROLEMIA: ICD-10-CM

## 2024-09-04 DIAGNOSIS — M19.90 OSTEOARTHRITIS, UNSPECIFIED OSTEOARTHRITIS TYPE, UNSPECIFIED SITE: ICD-10-CM

## 2024-09-04 DIAGNOSIS — G60.9 IDIOPATHIC PERIPHERAL NEUROPATHY: ICD-10-CM

## 2024-09-04 PROCEDURE — 3078F DIAST BP <80 MM HG: CPT | Performed by: FAMILY MEDICINE

## 2024-09-04 PROCEDURE — 3077F SYST BP >= 140 MM HG: CPT | Performed by: FAMILY MEDICINE

## 2024-09-04 PROCEDURE — 99396 PREV VISIT EST AGE 40-64: CPT | Performed by: FAMILY MEDICINE

## 2024-09-04 RX ORDER — GABAPENTIN 600 MG/1
600 TABLET ORAL 2 TIMES DAILY
Qty: 180 TABLET | Refills: 3 | Status: SHIPPED | OUTPATIENT
Start: 2024-09-04 | End: 2025-09-04

## 2024-09-04 RX ORDER — AMLODIPINE AND BENAZEPRIL HYDROCHLORIDE 5; 10 MG/1; MG/1
CAPSULE ORAL
Qty: 90 CAPSULE | Refills: 3 | Status: SHIPPED | OUTPATIENT
Start: 2024-09-04

## 2024-09-04 SDOH — ECONOMIC STABILITY: INCOME INSECURITY: HOW HARD IS IT FOR YOU TO PAY FOR THE VERY BASICS LIKE FOOD, HOUSING, MEDICAL CARE, AND HEATING?: NOT HARD AT ALL

## 2024-09-04 SDOH — ECONOMIC STABILITY: FOOD INSECURITY: WITHIN THE PAST 12 MONTHS, YOU WORRIED THAT YOUR FOOD WOULD RUN OUT BEFORE YOU GOT MONEY TO BUY MORE.: NEVER TRUE

## 2024-09-04 SDOH — ECONOMIC STABILITY: FOOD INSECURITY: WITHIN THE PAST 12 MONTHS, THE FOOD YOU BOUGHT JUST DIDN'T LAST AND YOU DIDN'T HAVE MONEY TO GET MORE.: NEVER TRUE

## 2024-09-04 ASSESSMENT — PATIENT HEALTH QUESTIONNAIRE - PHQ9
SUM OF ALL RESPONSES TO PHQ QUESTIONS 1-9: 0
2. FEELING DOWN, DEPRESSED OR HOPELESS: NOT AT ALL
SUM OF ALL RESPONSES TO PHQ9 QUESTIONS 1 & 2: 0
1. LITTLE INTEREST OR PLEASURE IN DOING THINGS: NOT AT ALL

## 2024-11-12 ENCOUNTER — TELEPHONE (OUTPATIENT)
Dept: FAMILY MEDICINE CLINIC | Facility: CLINIC | Age: 64
End: 2024-11-12

## 2024-11-12 DIAGNOSIS — I25.9 CHEST PAIN DUE TO MYOCARDIAL ISCHEMIA, UNSPECIFIED ISCHEMIC CHEST PAIN TYPE: Primary | ICD-10-CM

## 2024-11-12 NOTE — TELEPHONE ENCOUNTER
Patient had cpx with Dr. Singh in September and mentioned that he has occasional chest pain. Dr. Singh told him to let him know the next time it happens and he would order a stress test. Yesterday, patient was walking and started having the chest pain again. Please send referral for stress test and advise patient.

## 2024-12-27 DIAGNOSIS — R94.39 ABNORMAL CARDIOVASCULAR STRESS TEST: Primary | ICD-10-CM

## 2025-01-07 ENCOUNTER — INITIAL CONSULT (OUTPATIENT)
Age: 65
End: 2025-01-07
Payer: COMMERCIAL

## 2025-01-07 VITALS
WEIGHT: 211.4 LBS | HEIGHT: 72 IN | SYSTOLIC BLOOD PRESSURE: 138 MMHG | HEART RATE: 57 BPM | BODY MASS INDEX: 28.63 KG/M2 | DIASTOLIC BLOOD PRESSURE: 70 MMHG

## 2025-01-07 DIAGNOSIS — Z76.89 ENCOUNTER TO ESTABLISH CARE: Primary | ICD-10-CM

## 2025-01-07 DIAGNOSIS — R94.39 ABNORMAL STRESS TEST: ICD-10-CM

## 2025-01-07 DIAGNOSIS — I10 ESSENTIAL HYPERTENSION: ICD-10-CM

## 2025-01-07 DIAGNOSIS — R93.1 AGATSTON CORONARY ARTERY CALCIUM SCORE BETWEEN 200 AND 399: ICD-10-CM

## 2025-01-07 DIAGNOSIS — E78.2 HYPERLIPIDEMIA, MIXED: ICD-10-CM

## 2025-01-07 DIAGNOSIS — I20.0 ACCELERATING ANGINA (HCC): ICD-10-CM

## 2025-01-07 LAB
ANION GAP SERPL CALC-SCNC: 13 MMOL/L (ref 7–16)
BUN SERPL-MCNC: 16 MG/DL (ref 8–23)
CALCIUM SERPL-MCNC: 8.6 MG/DL (ref 8.8–10.2)
CHLORIDE SERPL-SCNC: 101 MMOL/L (ref 98–107)
CO2 SERPL-SCNC: 24 MMOL/L (ref 20–29)
CREAT SERPL-MCNC: 1.14 MG/DL (ref 0.8–1.3)
ERYTHROCYTE [DISTWIDTH] IN BLOOD BY AUTOMATED COUNT: 16.3 % (ref 11.9–14.6)
GLUCOSE SERPL-MCNC: 80 MG/DL (ref 70–99)
HCT VFR BLD AUTO: 44.2 % (ref 41.1–50.3)
HGB BLD-MCNC: 14.3 G/DL (ref 13.6–17.2)
MCH RBC QN AUTO: 26.8 PG (ref 26.1–32.9)
MCHC RBC AUTO-ENTMCNC: 32.4 G/DL (ref 31.4–35)
MCV RBC AUTO: 82.9 FL (ref 82–102)
NRBC # BLD: 0 K/UL (ref 0–0.2)
PLATELET # BLD AUTO: 190 K/UL (ref 150–450)
PMV BLD AUTO: 10 FL (ref 9.4–12.3)
POTASSIUM SERPL-SCNC: 4.2 MMOL/L (ref 3.5–5.1)
RBC # BLD AUTO: 5.33 M/UL (ref 4.23–5.6)
SODIUM SERPL-SCNC: 139 MMOL/L (ref 136–145)
WBC # BLD AUTO: 8.2 K/UL (ref 4.3–11.1)

## 2025-01-07 PROCEDURE — 3075F SYST BP GE 130 - 139MM HG: CPT | Performed by: INTERNAL MEDICINE

## 2025-01-07 PROCEDURE — 3078F DIAST BP <80 MM HG: CPT | Performed by: INTERNAL MEDICINE

## 2025-01-07 PROCEDURE — 93000 ELECTROCARDIOGRAM COMPLETE: CPT | Performed by: INTERNAL MEDICINE

## 2025-01-07 PROCEDURE — 99205 OFFICE O/P NEW HI 60 MIN: CPT | Performed by: INTERNAL MEDICINE

## 2025-01-07 RX ORDER — AMLODIPINE AND BENAZEPRIL HYDROCHLORIDE 5; 20 MG/1; MG/1
1 CAPSULE ORAL DAILY
Qty: 90 CAPSULE | Refills: 3 | Status: SHIPPED | OUTPATIENT
Start: 2025-01-07 | End: 2026-01-07

## 2025-01-07 RX ORDER — SODIUM CHLORIDE 0.9 % (FLUSH) 0.9 %
5-40 SYRINGE (ML) INJECTION EVERY 12 HOURS SCHEDULED
OUTPATIENT
Start: 2025-01-07

## 2025-01-07 RX ORDER — SODIUM CHLORIDE 9 MG/ML
INJECTION, SOLUTION INTRAVENOUS PRN
OUTPATIENT
Start: 2025-01-07

## 2025-01-07 RX ORDER — SODIUM CHLORIDE 0.9 % (FLUSH) 0.9 %
5-40 SYRINGE (ML) INJECTION PRN
OUTPATIENT
Start: 2025-01-07

## 2025-01-07 RX ORDER — ATORVASTATIN CALCIUM 40 MG/1
40 TABLET, FILM COATED ORAL
Qty: 90 TABLET | Refills: 3 | Status: SHIPPED | OUTPATIENT
Start: 2025-01-07

## 2025-01-07 RX ORDER — SODIUM CHLORIDE 9 MG/ML
INJECTION, SOLUTION INTRAVENOUS CONTINUOUS
OUTPATIENT
Start: 2025-01-07

## 2025-01-07 ASSESSMENT — ENCOUNTER SYMPTOMS
DOUBLE VISION: 0
ABDOMINAL PAIN: 0
COUGH: 0
BACK PAIN: 0
BLURRED VISION: 0
VOMITING: 0
SHORTNESS OF BREATH: 0
ORTHOPNEA: 0
HEMOPTYSIS: 0
NAUSEA: 0
BLOATING: 0

## 2025-01-07 NOTE — PROGRESS NOTES
mmHg    Stress Diastolic BP 72 mmHg    Stress Rate Pressure Product 25,560 bpm*mmHg    Stress Target  bpm    Stress Percent HR Achieved 90 %    Exercise Duration Time 11 min    Exercise Duration Seconds 21 sec    Stress Estimated Workload 13.0 METS    Body Surface Area 2.17 m2    Stress ST Depression 2.0 mm    Nuc Stress EF 63 %     Lab Results   Component Value Date/Time    CHOL 207 08/28/2024 09:36 AM    HDL 49 08/28/2024 09:36 AM     08/28/2024 09:36 AM    .6 08/24/2023 08:03 AM    VLDL 53 08/28/2024 09:36 AM    VLDL 50 08/02/2021 09:09 AM       No results found for any visits on 01/07/25.    ASSESSMENT and PLAN    Vishnu was seen today for consultation.    Diagnoses and all orders for this visit:    Encounter to establish care  -     EKG 12 Lead    Abnormal stress test  -     Basic Metabolic Panel; Future  -     CBC (Hemogram); Future    Accelerating angina (HCC)    Agatston coronary artery calcium score between 200 and 399    Hyperlipidemia, mixed    Essential hypertension  -     Echo (TTE) complete (PRN contrast/bubble/strain/3D); Future    Other orders  -     Left Heart Cath / Coronary Angiography; Standing  -     Initiate PAT Protocol; Future  -     Full code; Standing  -     Verify informed consent; Standing  -     Verify pre-procedure history and physical completed; Standing  -     Procedure Consent; Standing  -     Diagnosis for Procedure; Standing  -     Diet NPO Exceptions are: Sips of Water with Meds; Standing  -     Verify modification of diabetic agents; Standing  -     Initiate Oxygen Therapy Protocol; Standing  -     CBC; Standing  -     Basic Metabolic Panel; Standing  -     Electrocardiogram, 12-lead ; Standing  -     0.9 % sodium chloride infusion  -     sodium chloride flush 0.9 % injection 5-40 mL  -     sodium chloride flush 0.9 % injection 5-40 mL  -     0.9 % sodium chloride infusion  -     Left Heart Cath / Coronary Angiography  -     atorvastatin (LIPITOR) 40 MG

## 2025-01-20 ENCOUNTER — TELEPHONE (OUTPATIENT)
Age: 65
End: 2025-01-20

## 2025-01-20 NOTE — PROGRESS NOTES
Patient pre-assessment complete for [facility] scheduled for Dr Catherine, arrival time 0600. Patient verified using .  NPO status reinforced. Patient informed to take a full dose aspirin 325mg  or 81 mg x 4 on the day of procedure. Instructed they can take all other medications excluding vitamins & supplements. Patient verbalizes understanding of all instructions & denies any questions at this time.

## 2025-01-21 ENCOUNTER — HOSPITAL ENCOUNTER (OUTPATIENT)
Age: 65
Setting detail: OUTPATIENT SURGERY
Discharge: HOME | End: 2025-01-21
Attending: INTERNAL MEDICINE | Admitting: INTERNAL MEDICINE
Payer: COMMERCIAL

## 2025-01-21 VITALS
DIASTOLIC BLOOD PRESSURE: 78 MMHG | TEMPERATURE: 98 F | WEIGHT: 205 LBS | HEIGHT: 72 IN | SYSTOLIC BLOOD PRESSURE: 120 MMHG | RESPIRATION RATE: 16 BRPM | BODY MASS INDEX: 27.77 KG/M2 | HEART RATE: 65 BPM | OXYGEN SATURATION: 94 %

## 2025-01-21 DIAGNOSIS — I20.0 ACCELERATING ANGINA (HCC): ICD-10-CM

## 2025-01-21 LAB
ECHO BSA: 2.17 M2
EKG ATRIAL RATE: 58 BPM
EKG DIAGNOSIS: NORMAL
EKG P AXIS: 38 DEGREES
EKG P-R INTERVAL: 174 MS
EKG Q-T INTERVAL: 462 MS
EKG QRS DURATION: 94 MS
EKG QTC CALCULATION (BAZETT): 453 MS
EKG R AXIS: 7 DEGREES
EKG T AXIS: 82 DEGREES
EKG VENTRICULAR RATE: 58 BPM

## 2025-01-21 PROCEDURE — 93458 L HRT ARTERY/VENTRICLE ANGIO: CPT | Performed by: INTERNAL MEDICINE

## 2025-01-21 PROCEDURE — 2500000003 HC RX 250 WO HCPCS: Performed by: INTERNAL MEDICINE

## 2025-01-21 PROCEDURE — 6360000004 HC RX CONTRAST MEDICATION: Performed by: INTERNAL MEDICINE

## 2025-01-21 PROCEDURE — 93005 ELECTROCARDIOGRAM TRACING: CPT | Performed by: INTERNAL MEDICINE

## 2025-01-21 PROCEDURE — C1894 INTRO/SHEATH, NON-LASER: HCPCS | Performed by: INTERNAL MEDICINE

## 2025-01-21 PROCEDURE — 93010 ELECTROCARDIOGRAM REPORT: CPT | Performed by: INTERNAL MEDICINE

## 2025-01-21 PROCEDURE — C1769 GUIDE WIRE: HCPCS | Performed by: INTERNAL MEDICINE

## 2025-01-21 PROCEDURE — 99152 MOD SED SAME PHYS/QHP 5/>YRS: CPT | Performed by: INTERNAL MEDICINE

## 2025-01-21 PROCEDURE — 2709999900 HC NON-CHARGEABLE SUPPLY: Performed by: INTERNAL MEDICINE

## 2025-01-21 PROCEDURE — 6360000002 HC RX W HCPCS: Performed by: INTERNAL MEDICINE

## 2025-01-21 RX ORDER — HEPARIN SODIUM 200 [USP'U]/100ML
INJECTION, SOLUTION INTRAVENOUS CONTINUOUS PRN
Status: DISCONTINUED | OUTPATIENT
Start: 2025-01-21 | End: 2025-01-21 | Stop reason: HOSPADM

## 2025-01-21 RX ORDER — IOPAMIDOL 755 MG/ML
INJECTION, SOLUTION INTRAVASCULAR PRN
Status: DISCONTINUED | OUTPATIENT
Start: 2025-01-21 | End: 2025-01-21 | Stop reason: HOSPADM

## 2025-01-21 RX ORDER — LIDOCAINE HYDROCHLORIDE 10 MG/ML
INJECTION, SOLUTION INFILTRATION; PERINEURAL PRN
Status: DISCONTINUED | OUTPATIENT
Start: 2025-01-21 | End: 2025-01-21 | Stop reason: HOSPADM

## 2025-01-21 RX ORDER — MIDAZOLAM HYDROCHLORIDE 1 MG/ML
INJECTION, SOLUTION INTRAMUSCULAR; INTRAVENOUS PRN
Status: DISCONTINUED | OUTPATIENT
Start: 2025-01-21 | End: 2025-01-21 | Stop reason: HOSPADM

## 2025-01-21 RX ORDER — ASPIRIN 81 MG/1
324 TABLET, CHEWABLE ORAL ONCE
Status: DISCONTINUED | OUTPATIENT
Start: 2025-01-21 | End: 2025-01-21 | Stop reason: HOSPADM

## 2025-01-21 NOTE — PROGRESS NOTES
TRANSFER - OUT REPORT:    Verbal report given to RN on Vishnu Chiu  being transferred to CPRU for routine progression of patient care       Report consisted of patient's Situation, Background, Assessment and   Recommendations(SBAR).     Information from the following report(s) Nurse Handoff Report was reviewed with the receiving nurse.    [facility] w/ Dr. Catherine  No interventions  R radial access  TR band to R radial @ 12mL  No s/sxs of bleeding or hematoma to R radial access site    Heparin 5000u IV  Versed 4mg IV   Fentanyl 50mcg IV

## 2025-01-21 NOTE — PROGRESS NOTES
TRANSFER - IN REPORT:    Verbal report received from EPI Briggs on Vishnu Luque Dye being received from [facility] for routine progression of patient care      Report consisted of patient’s Situation, Background, Assessment and Recommendations(SBAR).     Information from the following report(s) SBAR, Kardex, Procedure Summary, MAR, and Recent Results was reviewed with the receiving nurse.    Opportunity for questions and clarification was provided.      Assessment completed upon patient’s arrival to unit and care assumed.

## 2025-01-21 NOTE — PROGRESS NOTES
Patient received to CPRU room # 10  Ambulatory from [facility]. Patient scheduled for C today with Dr Catherine. Procedure reviewed & questions answered, voiced good understanding consent obtained & placed on chart. All medications and medical history reviewed. Will prep patient per orders. Patient & family updated on plan of care.      The patient has a fraility score of 3-MANAGING WELL, based on ambulation. Patient took Aspirin 324 mg today at 0500 prior to arrival.

## 2025-01-21 NOTE — DISCHARGE INSTRUCTIONS
HEART CATHETERIZATION/ANGIOGRAPHY DISCHARGE INSTRUCTIONS    Check puncture site frequently for swelling or bleeding. If there is any bleeding, apply pressure over the area with a clean towel or washcloth and call 911. Notify your doctor for any redness, swelling, drainage, or oozing from the puncture site. Notify your doctor for any fever or chills.  If the extremity becomes cold, numb, or painful call Dr. Catherine at [phone].  Activity should be limited for the next 48 hours. No heavy lifting, pushing, pulling  or strenuous activity for 48 hours. No heavy lifting (anything over 10 pounds) for 3 days.  You may resume your usual diet. Drink more fluids than usual.  Have a responsible person drive you home and stay with you for at least 24 hours after your heart catheterization/angiography.  You may remove bandage from your right arm in 24 hours. You may shower in 24 hours. No tub baths, hot tubs, or swimming for 1 week. Do not place any lotions, creams, powders, or ointments over puncture site for 1 week. You may place a clean band-aid over the puncture site each day for 5 days. Change daily.        Sedation for a Medical Procedure: Care Instructions     You were given a sedative medication during your visit. While many of the effects will have worn   off before you leave; you may continue to feel some effects for several hours.      Common side effects from sedation include:  Feeling sleepy. (Your doctors and nurses will make sure you are not too sleepy to go home.)  Nausea and vomiting. This usually does not last long.  Feeling tired.     How can you care for yourself at home?  Activity    Don't do anything for 24 hours that requires attention to detail. It takes time for the medicine effects to completely wear off.     Do not make important legal decisions for 24 hours.     Do not sign any legal documents for 24 hours.     Do not drink alcohol today     For your safety, you should not drive or operate heavy

## 2025-01-21 NOTE — PROGRESS NOTES
Radial compression band removed at 1100 after slowly reducing air from 12 cc to zero as per hospital protocol. No bleeding or hematoma noted. 2 x 2 gauze with tegaderm placed over puncture site. The affected extremity is warm and dry to the touch. Frequent vital signs documented per flowheet. Patient instructed to call if any bleeding noted on gauze. Patient verbalized understanding the nursing instructions.

## 2025-01-22 ENCOUNTER — TELEPHONE (OUTPATIENT)
Dept: FAMILY MEDICINE CLINIC | Facility: CLINIC | Age: 65
End: 2025-01-22

## 2025-01-23 ENCOUNTER — TELEPHONE (OUTPATIENT)
Age: 65
End: 2025-01-23

## 2025-01-23 DIAGNOSIS — I20.0 ACCELERATING ANGINA (HCC): Primary | ICD-10-CM

## 2025-01-23 DIAGNOSIS — R07.9 CHEST PAIN: ICD-10-CM

## 2025-01-23 NOTE — TELEPHONE ENCOUNTER
Patient called and patient is requesting a GI referral. Stated that the chest pain is prob. Coming from GI problems per wife.

## 2025-01-27 NOTE — TELEPHONE ENCOUNTER
Spoke with pt's spouse Brooklynn and she stated when pt was seen on 01/07/25 his BP meds were changed and now his BP is running high. 140-160/60-70. Pt's spouse is wondering if pt needs another medication.

## 2025-01-28 ENCOUNTER — OFFICE VISIT (OUTPATIENT)
Age: 65
End: 2025-01-28
Payer: COMMERCIAL

## 2025-01-28 ENCOUNTER — PREP FOR PROCEDURE (OUTPATIENT)
Age: 65
End: 2025-01-28

## 2025-01-28 ENCOUNTER — TELEPHONE (OUTPATIENT)
Age: 65
End: 2025-01-28

## 2025-01-28 VITALS
RESPIRATION RATE: 17 BRPM | BODY MASS INDEX: 28.5 KG/M2 | HEART RATE: 66 BPM | HEIGHT: 72 IN | WEIGHT: 210.4 LBS | OXYGEN SATURATION: 98 % | SYSTOLIC BLOOD PRESSURE: 124 MMHG | DIASTOLIC BLOOD PRESSURE: 62 MMHG

## 2025-01-28 DIAGNOSIS — Z12.11 ENCOUNTER FOR SCREENING COLONOSCOPY: ICD-10-CM

## 2025-01-28 DIAGNOSIS — R07.9 CHEST PAIN, UNSPECIFIED TYPE: Primary | ICD-10-CM

## 2025-01-28 PROCEDURE — 3078F DIAST BP <80 MM HG: CPT | Performed by: PHYSICIAN ASSISTANT

## 2025-01-28 PROCEDURE — 3074F SYST BP LT 130 MM HG: CPT | Performed by: PHYSICIAN ASSISTANT

## 2025-01-28 PROCEDURE — 99204 OFFICE O/P NEW MOD 45 MIN: CPT | Performed by: PHYSICIAN ASSISTANT

## 2025-01-28 RX ORDER — AMLODIPINE AND BENAZEPRIL HYDROCHLORIDE 10; 20 MG/1; MG/1
1 CAPSULE ORAL DAILY
Qty: 90 CAPSULE | Refills: 3 | Status: SHIPPED | OUTPATIENT
Start: 2025-01-28

## 2025-01-28 RX ORDER — SODIUM CHLORIDE 9 MG/ML
25 INJECTION, SOLUTION INTRAVENOUS PRN
Status: CANCELLED | OUTPATIENT
Start: 2025-01-28

## 2025-01-28 RX ORDER — SODIUM CHLORIDE 0.9 % (FLUSH) 0.9 %
5-40 SYRINGE (ML) INJECTION PRN
Status: CANCELLED | OUTPATIENT
Start: 2025-01-28

## 2025-01-28 RX ORDER — SODIUM CHLORIDE 0.9 % (FLUSH) 0.9 %
5-40 SYRINGE (ML) INJECTION EVERY 12 HOURS SCHEDULED
Status: CANCELLED | OUTPATIENT
Start: 2025-01-28

## 2025-01-28 NOTE — TELEPHONE ENCOUNTER
Requested Prescriptions     Pending Prescriptions Disp Refills    amLODIPine-benazepril (LOTREL) 10-20 MG per capsule 90 capsule 3     Sig: Take 1 capsule by mouth daily       Spoke to pt's spouse and informed of provider response.

## 2025-01-28 NOTE — PROGRESS NOTES
Vishnu Chiu (:  1960) is a 64 y.o. male new patient referred to our office for evaluation of the following chief complaint(s):  New Patient        Assessment & Plan   ASSESSMENT/PLAN:  1. Chest pain, unspecified type  2. Encounter for screening colonoscopy    -Symptoms are noted with exercise, not with food. Acid reflux is well controlled with chronic Prilosec 20 mg daily use. Stress test was positive followed by [facility] 2025 which showed mild nonobstructive CAD. No stents placed. Has upcoming echo and does have a murmur on exam. Will plan for EGD and colonoscopy (he is overdue) after his echo with cardiac clearance.     Subjective   SUBJECTIVE/OBJECTIVE  Vishnu Chiu is a 64 y.o. year old male referred to our office for evaluation of chest pain. Referral note reviewed from 2025.  Patient had stress test 2024 which showed moderate risk for cardiac events, mild severity left ventricular stress perfusion defect followed by [facility] 2025 which showed mild nonobstructive coronary artery disease.  Prior pertinent GI evaluation includes:    -EGD 2018 (Dr. Rodriguez): irregular Z-line, small hiatal hernia, few small less than 5 mm fundic gland polyps, normal duodenum; duodenal biopsies histologically unremarkable, distal esophagus biopsy showed mild chronic, focally active inflammation, negative for Granados's and dysplasia  -Colonoscopy 2018 (Dr. Rodriguez): good prep, small internal hemorrhoids, otherwise normal colonoscopy; recall 5 years  -Capsule endoscopy 2018: negative for etiology of ROSALBA    Today patient reports he's been told his cardiac work-up was unrevealing and his cardiologist recommended work-up for possible GI source of pain. He reports he develops anterior chest pressure and mild SOB with walking up incline or stairs. Symptoms are typically resolved with rest. Denies any symptoms with meals or at night. Denies nausea, vomiting. He has a hx of mild occasional acid reflux well

## 2025-01-28 NOTE — TELEPHONE ENCOUNTER
Patient in office, scheduled for colonoscopy and EGD with Dr. Palafox Thursday 3/6/2025 at [facility]. Suprep instructions given to the patient in office. Sent a staff message to the nurse to have Suprep sent to the pharmacy on file.      Items to purchase 5 days before your procedure:    Suprep -  prescription at your pharmacy.  Purchase one 10oz bottle of Magnesium Citrate  Purchase Dulcolax Laxative tablets (Not stool softener tablets).      Two days before your procedure:  Tuesday 3/4/2025    Drink at least eight glasses of water today.  Stop eating high- fiber foods such as vegetables and beans until after your colonoscopy. You can eat all other types of food today.     Drink the 10 oz bottle of magnesium citrate after dinner.      The day before your Colonoscopy: Wednesday 3/5/2025    Clear liquids only the entire day (water, Gatorade, coffee, tea, Sprite, 7-up, Jell-O, ginger ale, popsicles, chicken / beef broth, apple juice).    No milk / No dairy products, NO RED, BLUE or PURPLE color liquids /dyes    4:00 pm Take 2 Dulcolax tablets.     6:00 pm - Pour one 6 oz bottle of Suprep liquid into the mixing container. Add cold water to the 16-oz line and mix. Drink all the solution over 10-15 minutes.   Drink two more 16-ounce glasses of water over the next hour.    The day of your procedure:Thursday 3/6/2025    6 hours prior to arrival time of your procedure, pour one 6 oz bottle of Suprep liquid into the mixing container. Add cold water to the 16-oz line and mix. Drink all the solution over 10-15 minutes. Drink two more 16-ounce glasses of water. Must finish drinking the final glass of water at least 4 hours prior to arrival time.    2 hours prior to arrival, we ask that you drink 32oz of clear noncaffeinated beverage, such as water, apple juice etc.    NOTHING TO EAT UNTIL AFTER YOUR PROCEDURE.    IMPORTANT:      If you do not follow these instructions, your colonoscopy could be canceled due to having an unclean

## 2025-01-29 ENCOUNTER — TELEPHONE (OUTPATIENT)
Age: 65
End: 2025-01-29

## 2025-01-29 DIAGNOSIS — Z12.11 ENCOUNTER FOR SCREENING COLONOSCOPY: Primary | ICD-10-CM

## 2025-01-29 RX ORDER — SODIUM, POTASSIUM,MAG SULFATES 17.5-3.13G
1 SOLUTION, RECONSTITUTED, ORAL ORAL SEE ADMIN INSTRUCTIONS
Qty: 1 EACH | Refills: 0 | Status: SHIPPED | OUTPATIENT
Start: 2025-01-29

## 2025-01-29 NOTE — TELEPHONE ENCOUNTER
Staff message 1/28/25:  \"Adebayo Flannery Amanda, RN  Please send Suprep to the pharmacy on file. Thanks\"    -------------------    As per standing order from Dr. Palafox, Suprep sent to pharmacy on file.

## 2025-01-30 ENCOUNTER — NURSE ONLY (OUTPATIENT)
Age: 65
End: 2025-01-30

## 2025-01-30 VITALS — SYSTOLIC BLOOD PRESSURE: 160 MMHG | DIASTOLIC BLOOD PRESSURE: 72 MMHG

## 2025-01-30 NOTE — PROGRESS NOTES
Patient walked in office requesting we check his BP along with his home monitor. He stated he had not started the new medication dosage called in because his BP was normal at GI and he wanted to make sure it wasn't his machine before increasing dose. BP was 160/72 and similar on his machine. He is going to go ahead and start medication and keep a log for the next two weeks and will send a Mychart with log. Dr. Chow agreed to plan.

## 2025-02-12 ENCOUNTER — TELEPHONE (OUTPATIENT)
Age: 65
End: 2025-02-12

## 2025-02-12 NOTE — TELEPHONE ENCOUNTER
The patient is scheduled for a colonoscopy and EGD on 3/6/2025. Cardiac clearance is needed. The patient had a echo and his follow up appointment with the Dr. Chow/[facility] Cardiology is 3/28/2025. Called cardiology and rep stated the patient had an appointment on 2/14/2025, but it was rescheduled for 3/28/2025. Called and left a voicemail for the patient to return call, may have to reschedule colon/EGD.

## 2025-02-12 NOTE — TELEPHONE ENCOUNTER
Cardiac Clearance        Physician or Practice Requesting:DR Palafox  : faustina   Contact Phone Number: [phone]  Fax Number: put in Epic   Date of Surgery/Procedure: 03/06  Type of Surgery or Procedure: colo EGD   Type of Anesthesia: general   Type of Clearance Requested: Cardiac Clearance  Medication to Hold:none ?  Days to Hold: none ?

## 2025-02-12 NOTE — TELEPHONE ENCOUNTER
EGD and colonoscopy scheduled 3/6/2025. Patient returned call and I spoke with the patient and we discussed that his follow up appointment from the echo with Dr. Chow/cardiology is after the EGD/colon procedure date. He stated that he was thinking that we could call there office and request the clearance. I called Dr. Chow office and spoke with Mala and she sent a request for clearance to the provider.

## 2025-02-14 ENCOUNTER — TELEPHONE (OUTPATIENT)
Age: 65
End: 2025-02-14

## 2025-02-14 NOTE — TELEPHONE ENCOUNTER
Cardiac Clearance      Physician or Practice Requesting: Dr. Palafox  : Adebayo  Contact Phone Number: [phone]  Fax Number: Not sure  Date of Surgery/Procedure: 03/06/2025  Type of Surgery or Procedure: Colonoscopy and endoscopy  Type of Anesthesia: Not sure  Type of Clearance Requested: Cardiac Clearance  Medication to Hold:Not sure

## 2025-02-27 PROBLEM — Z12.11 ENCOUNTER FOR SCREENING COLONOSCOPY: Status: RESOLVED | Noted: 2025-01-28 | Resolved: 2025-02-27

## 2025-02-28 RX ORDER — MULTIVITAMIN
1 TABLET ORAL DAILY
COMMUNITY

## 2025-02-28 NOTE — PERIOP NOTE
Patient name, , and procedure verified.    Type: 1a; abbreviated assessment per anesthesia guidelines    Labs per anesthesia: none    2025 heart cath, no blockages. 2/10/2025 Echo LVEF 70-75% mild aortic stenosis    Instructed will receive notificattion the day before procedure by the GI Lab of time of arrival for [facility] cases, and by Pre-op Department for [facility] cases. Arrival times should be called by 3 pm. If no phone is received the patient should contact their respective hospital. The GI lab telephone number is [phone] and ES Pre-op is [phone].     Follow diet and prep instructions per office including NPO status.  If patient has NOT received instructions from office patient is advised to call surgeon office, verbalizes understanding.    Bath or shower the night before and the am of surgery with non-mositurizing soap. No lotions, oils, powders, cologne on skin. No make up, eye make up or jewelry. Wear loose fitting comfortable, clean clothing.     Must have adult present in building the entire time .     PLEASE DO NOT STOP ANY PRESCRIPTION MEDICATIONS UNLESS SPECIFIED BELOW:  PRESCRIPTION MEDICATIONS TO HOLD : none    Please stop all vitamins & supplements 7 days prior to surgery and stop all NSAIDS ( Aspirin/Excedrin/BC & Goody Powder, ibuprofen/Motrin/Advil, naproxen/Aleve) 5 days before your surgery. Should you have a surgery date that does not allow for the amount of time instructed above, please stop taking vitamins, supplements, and NSAIDS IMMEDIATELY.    TAKE ONLY THE FOLLOWING MEDICATION ON THE DAY OF SURGERY:  (MAC anesthesia)  Amlodipine-benazepril (Lotrel)  Gabapentin (Neurontin)  Omeprazole (Prilosec)      The following discharge instructions reviewed : medication given during procedure may cause drowsiness for several hours, therefore, patient must not drive or operate machinery for remainder of the day.Patient may not drink alcohol on the day of your procedure, and should resume  regular diet and activity unless otherwise directed. You may experience abdominal distention for several hours that is relieved by the passage of gas. Contact your physician if you have any of the following: fever or chills, severe abdominal pain or excessive amount of bleeding or a large amount when having a bowel movement. Occasional specks of blood with bowel movement would not be unusual.  Please bring the following that apply: CPAP or Bi-Pap, portable oxygen, rescue inhalers, remote for spinal cord stimulator or any electronic remote implant, and post-operative supplies such as cold therapy pad, sling, brace, shoe or boot as it applies to your case.      You may be required to pay a deductible or co-pay on the day of your procedure. You can pre-pay by calling [phone] if your surgery is at the [facility] or [phone] if your surgery is at the [facility].

## 2025-03-03 PROBLEM — Z12.11 ENCOUNTER FOR SCREENING COLONOSCOPY: Status: ACTIVE | Noted: 2025-01-28

## 2025-03-05 ENCOUNTER — ANESTHESIA EVENT (OUTPATIENT)
Dept: ENDOSCOPY | Age: 65
End: 2025-03-05
Payer: COMMERCIAL

## 2025-03-05 NOTE — H&P
HISTORY AND PHYSICAL             Date: 3/5/2025        Patient Name: Vishnu Chiu     YOB: 1960      Age:  64 y.o.      History of Present Illness   Chest pain   Due for screening colon     Past Medical History     Past Medical History:   Diagnosis Date    Arthritis     Chronic pain     C6-C7 bulging disc; Neuro eval. 2010    Colon polyps     Elevated liver function tests 2013    while on fenofibrate    GERD (gastroesophageal reflux disease)     omeprazole, 1 pillow, controlled    H/O echocardiogram 02/10/2025    ECHO LVEF EF of 70 - 75%    Heart murmur 02/10/2025    mild aortic stenosis - Echo 1/2025    Hypercholesterolemia     Hypertension     Mild aortic stenosis 02/10/2025    ECHO LVEF EF of 70 - 75%    Polycythemia 2013    normal erythropoietin level        Past Surgical History     Past Surgical History:   Procedure Laterality Date    CARDIAC PROCEDURE N/A 01/21/2025    Left heart cath / coronary angiography performed by Bowen Catherine MD at [facility] CARDIAC CATH LAB    COLONOSCOPY  6/2014, 7/19/18    repeat 5 yrs    ELBOW SURGERY Right 2016    right elbow with torn tendons    HERNIA REPAIR  09/2020    LUMBAR DISCECTOMY  2020    *L3-4 METRX FAR LATERAL TRANSPEDICULAR DISCECTOMY**    ORTHOPEDIC SURGERY  age 6    fractured leg        Medications Prior to Admission     Prior to Admission medications    Medication Sig Start Date End Date Taking? Authorizing Provider   amLODIPine-benazepril (LOTREL) 10-20 MG per capsule Take 1 capsule by mouth daily 1/28/25  Yes Gordon Chow MD   atorvastatin (LIPITOR) 40 MG tablet Take 1 tablet by mouth nightly 1/7/25  Yes Gordon Chow MD   gabapentin (NEURONTIN) 600 MG tablet Take 1 tablet by mouth 2 times daily. 9/4/24 9/4/25 Yes Vishnu Singh MD   omeprazole (PRILOSEC) 20 MG delayed release capsule Take 1 capsule by mouth daily 9/4/24  Yes Vishnu Singh MD   Multiple Vitamin (MULTI-VITAMIN) TABS Take 1 tablet by mouth daily    Provider, Historical,  grossly intact, but not individually tested.    - PSYCHIATRIC: Cooperative. Appropriate mood and affect.    - SKIN: No rashes, sores, or lesions.     - RECTAL: Deferred.     Labs      No results found for this or any previous visit (from the past 24 hour(s)).     Imaging/Diagnostics Last 24 Hours     See my note above, if applicable.       Assessment & Plan:   Chest pain - plan for EGD  Screening colon

## 2025-03-06 ENCOUNTER — ANESTHESIA (OUTPATIENT)
Dept: ENDOSCOPY | Age: 65
End: 2025-03-06
Payer: COMMERCIAL

## 2025-03-06 ENCOUNTER — APPOINTMENT (OUTPATIENT)
Dept: CT IMAGING | Age: 65
End: 2025-03-06
Attending: INTERNAL MEDICINE
Payer: COMMERCIAL

## 2025-03-06 ENCOUNTER — HOSPITAL ENCOUNTER (OUTPATIENT)
Age: 65
Setting detail: OUTPATIENT SURGERY
Discharge: HOME | End: 2025-03-06
Attending: INTERNAL MEDICINE | Admitting: INTERNAL MEDICINE
Payer: COMMERCIAL

## 2025-03-06 VITALS
OXYGEN SATURATION: 94 % | BODY MASS INDEX: 27.62 KG/M2 | RESPIRATION RATE: 16 BRPM | TEMPERATURE: 97.6 F | DIASTOLIC BLOOD PRESSURE: 76 MMHG | WEIGHT: 203.9 LBS | SYSTOLIC BLOOD PRESSURE: 135 MMHG | HEART RATE: 65 BPM | HEIGHT: 72 IN

## 2025-03-06 PROCEDURE — 45378 DIAGNOSTIC COLONOSCOPY: CPT | Performed by: INTERNAL MEDICINE

## 2025-03-06 PROCEDURE — 3609027000 HC COLONOSCOPY: Performed by: INTERNAL MEDICINE

## 2025-03-06 PROCEDURE — 2709999900 HC NON-CHARGEABLE SUPPLY: Performed by: INTERNAL MEDICINE

## 2025-03-06 PROCEDURE — 88305 TISSUE EXAM BY PATHOLOGIST: CPT

## 2025-03-06 PROCEDURE — 43239 EGD BIOPSY SINGLE/MULTIPLE: CPT | Performed by: INTERNAL MEDICINE

## 2025-03-06 PROCEDURE — 7100000011 HC PHASE II RECOVERY - ADDTL 15 MIN: Performed by: INTERNAL MEDICINE

## 2025-03-06 PROCEDURE — 7100000010 HC PHASE II RECOVERY - FIRST 15 MIN: Performed by: INTERNAL MEDICINE

## 2025-03-06 PROCEDURE — 3700000000 HC ANESTHESIA ATTENDED CARE: Performed by: INTERNAL MEDICINE

## 2025-03-06 PROCEDURE — 74261 CT COLONOGRAPHY DX: CPT

## 2025-03-06 PROCEDURE — 6360000002 HC RX W HCPCS: Performed by: STUDENT IN AN ORGANIZED HEALTH CARE EDUCATION/TRAINING PROGRAM

## 2025-03-06 PROCEDURE — 3700000001 HC ADD 15 MINUTES (ANESTHESIA): Performed by: INTERNAL MEDICINE

## 2025-03-06 PROCEDURE — 3609012400 HC EGD TRANSORAL BIOPSY SINGLE/MULTIPLE: Performed by: INTERNAL MEDICINE

## 2025-03-06 PROCEDURE — 2580000003 HC RX 258: Performed by: SURGERY

## 2025-03-06 RX ORDER — LABETALOL HYDROCHLORIDE 5 MG/ML
10 INJECTION, SOLUTION INTRAVENOUS
Status: DISCONTINUED | OUTPATIENT
Start: 2025-03-06 | End: 2025-03-06 | Stop reason: HOSPADM

## 2025-03-06 RX ORDER — SODIUM CHLORIDE 0.9 % (FLUSH) 0.9 %
5-40 SYRINGE (ML) INJECTION PRN
Status: DISCONTINUED | OUTPATIENT
Start: 2025-03-06 | End: 2025-03-06 | Stop reason: HOSPADM

## 2025-03-06 RX ORDER — SODIUM CHLORIDE 0.9 % (FLUSH) 0.9 %
5-40 SYRINGE (ML) INJECTION EVERY 12 HOURS SCHEDULED
Status: DISCONTINUED | OUTPATIENT
Start: 2025-03-06 | End: 2025-03-06 | Stop reason: HOSPADM

## 2025-03-06 RX ORDER — PROCHLORPERAZINE EDISYLATE 5 MG/ML
5 INJECTION INTRAMUSCULAR; INTRAVENOUS
Status: DISCONTINUED | OUTPATIENT
Start: 2025-03-06 | End: 2025-03-06 | Stop reason: HOSPADM

## 2025-03-06 RX ORDER — IPRATROPIUM BROMIDE AND ALBUTEROL SULFATE 2.5; .5 MG/3ML; MG/3ML
1 SOLUTION RESPIRATORY (INHALATION)
Status: DISCONTINUED | OUTPATIENT
Start: 2025-03-06 | End: 2025-03-06 | Stop reason: HOSPADM

## 2025-03-06 RX ORDER — SODIUM CHLORIDE 9 MG/ML
25 INJECTION, SOLUTION INTRAVENOUS PRN
Status: DISCONTINUED | OUTPATIENT
Start: 2025-03-06 | End: 2025-03-06 | Stop reason: HOSPADM

## 2025-03-06 RX ORDER — SODIUM CHLORIDE 9 MG/ML
INJECTION, SOLUTION INTRAVENOUS PRN
Status: DISCONTINUED | OUTPATIENT
Start: 2025-03-06 | End: 2025-03-06 | Stop reason: HOSPADM

## 2025-03-06 RX ORDER — NALOXONE HYDROCHLORIDE 0.4 MG/ML
INJECTION, SOLUTION INTRAMUSCULAR; INTRAVENOUS; SUBCUTANEOUS PRN
Status: DISCONTINUED | OUTPATIENT
Start: 2025-03-06 | End: 2025-03-06 | Stop reason: HOSPADM

## 2025-03-06 RX ORDER — OXYCODONE HYDROCHLORIDE 5 MG/1
5 TABLET ORAL
Status: DISCONTINUED | OUTPATIENT
Start: 2025-03-06 | End: 2025-03-06 | Stop reason: HOSPADM

## 2025-03-06 RX ORDER — HALOPERIDOL 5 MG/ML
1 INJECTION INTRAMUSCULAR
Status: DISCONTINUED | OUTPATIENT
Start: 2025-03-06 | End: 2025-03-06 | Stop reason: HOSPADM

## 2025-03-06 RX ORDER — LIDOCAINE HYDROCHLORIDE 20 MG/ML
INJECTION, SOLUTION EPIDURAL; INFILTRATION; INTRACAUDAL; PERINEURAL
Status: DISCONTINUED | OUTPATIENT
Start: 2025-03-06 | End: 2025-03-06 | Stop reason: SDUPTHER

## 2025-03-06 RX ORDER — LIDOCAINE HYDROCHLORIDE 10 MG/ML
1 INJECTION, SOLUTION INFILTRATION; PERINEURAL
Status: DISCONTINUED | OUTPATIENT
Start: 2025-03-06 | End: 2025-03-06 | Stop reason: HOSPADM

## 2025-03-06 RX ORDER — SODIUM CHLORIDE, SODIUM LACTATE, POTASSIUM CHLORIDE, CALCIUM CHLORIDE 600; 310; 30; 20 MG/100ML; MG/100ML; MG/100ML; MG/100ML
INJECTION, SOLUTION INTRAVENOUS CONTINUOUS
Status: DISCONTINUED | OUTPATIENT
Start: 2025-03-06 | End: 2025-03-06 | Stop reason: HOSPADM

## 2025-03-06 RX ORDER — PROPOFOL 10 MG/ML
INJECTION, EMULSION INTRAVENOUS
Status: DISCONTINUED | OUTPATIENT
Start: 2025-03-06 | End: 2025-03-06 | Stop reason: SDUPTHER

## 2025-03-06 RX ADMIN — PROPOFOL 200 MCG/KG/MIN: 10 INJECTION, EMULSION INTRAVENOUS at 07:59

## 2025-03-06 RX ADMIN — SODIUM CHLORIDE, SODIUM LACTATE, POTASSIUM CHLORIDE, AND CALCIUM CHLORIDE: .6; .31; .03; .02 INJECTION, SOLUTION INTRAVENOUS at 07:08

## 2025-03-06 RX ADMIN — PROPOFOL 100 MG: 10 INJECTION, EMULSION INTRAVENOUS at 07:58

## 2025-03-06 RX ADMIN — LIDOCAINE HYDROCHLORIDE 50 MG: 20 INJECTION, SOLUTION EPIDURAL; INFILTRATION; INTRACAUDAL; PERINEURAL at 07:50

## 2025-03-06 ASSESSMENT — PAIN - FUNCTIONAL ASSESSMENT: PAIN_FUNCTIONAL_ASSESSMENT: 0-10

## 2025-03-06 NOTE — ANESTHESIA POSTPROCEDURE EVALUATION
Department of Anesthesiology  Postprocedure Note    Patient: Vishnu Chiu  MRN: [id number]  YOB: 1960  Date of evaluation: 3/6/2025    Procedure Summary       Date: 03/06/25 Room / Location: [facility] ENDO 01 / [facility] ENDOSCOPY    Anesthesia Start: 0743 Anesthesia Stop: 0834    Procedures:       COLORECTAL CANCER SCREENING, NOT HIGH RISK (Lower GI Region)      ESOPHAGOGASTRODUODENOSCOPY BIOPSY (Upper GI Region) Diagnosis:       Encounter for screening colonoscopy      Chest pain, unspecified type      (Encounter for screening colonoscopy [Z12.11])      (Chest pain, unspecified type [R07.9])    Surgeons: Vadim Palafox MD Responsible Provider: Alvino Marie MD    Anesthesia Type: TIVA ASA Status: 3            Anesthesia Type: No value filed.    Cecile Phase I: Cecile Score: 10    Cecile Phase II: Cecile Score: 10    Anesthesia Post Evaluation    Patient location during evaluation: PACU  Patient participation: complete - patient participated  Level of consciousness: awake and alert  Airway patency: patent  Nausea: well controlled.  Cardiovascular status: acceptable.  Respiratory status: acceptable  Hydration status: stable  Pain management: adequate    No notable events documented.

## 2025-03-06 NOTE — PERIOP NOTE
Patient in CT for ordered procedure by Dr. Palafox.  Discharge instructions completed with spouse.  All questions answered. Pt will discharge home after CT is complete.

## 2025-03-06 NOTE — ANESTHESIA PRE PROCEDURE
tobacco: Never   Substance Use Topics    Alcohol use: Yes     Alcohol/week: 14.0 standard drinks of alcohol     Types: 14 Cans of beer per week     Comment: <14/week/ 2 daily                                Counseling given: Not Answered      Vital Signs (Current):   Vitals:    02/28/25 0950 03/06/25 0630   BP:  (!) 175/90   Pulse:  85   Resp:  18   Temp:  97.2 °F (36.2 °C)   TempSrc:  Temporal   SpO2:  92%   Weight: 93 kg (205 lb) 92.5 kg (203 lb 14.4 oz)   Height: 1.829 m (6') 1.829 m (6' 0.01\")                                              BP Readings from Last 3 Encounters:   03/06/25 (!) 175/90   02/10/25 (!) 160/76   01/30/25 (!) 160/72       NPO Status: Time of last liquid consumption: 0030                        Time of last solid consumption: 0000                        Date of last liquid consumption: 03/06/25                        Date of last solid food consumption: 03/04/25    BMI:   Wt Readings from Last 3 Encounters:   03/06/25 92.5 kg (203 lb 14.4 oz)   02/10/25 95.3 kg (210 lb)   01/28/25 95.4 kg (210 lb 6.4 oz)     Body mass index is 27.65 kg/m².    CBC:   Lab Results   Component Value Date/Time    WBC 8.2 01/07/2025 02:08 PM    RBC 5.33 01/07/2025 02:08 PM    HGB 14.3 01/07/2025 02:08 PM    HCT 44.2 01/07/2025 02:08 PM    MCV 82.9 01/07/2025 02:08 PM    RDW 16.3 01/07/2025 02:08 PM     01/07/2025 02:08 PM       CMP:   Lab Results   Component Value Date/Time     01/07/2025 02:08 PM    K 4.2 01/07/2025 02:08 PM     01/07/2025 02:08 PM    CO2 24 01/07/2025 02:08 PM    BUN 16 01/07/2025 02:08 PM    CREATININE 1.14 01/07/2025 02:08 PM    GFRAA >60 08/23/2022 07:28 AM    AGRATIO 1.6 08/02/2021 09:09 AM    LABGLOM 72 01/07/2025 02:08 PM    LABGLOM >60 08/24/2023 08:03 AM    GLUCOSE 80 01/07/2025 02:08 PM    CALCIUM 8.6 01/07/2025 02:08 PM    BILITOT 0.6 08/28/2024 09:36 AM    ALKPHOS 62 08/28/2024 09:36 AM    ALKPHOS 83 08/02/2021 09:09 AM    AST 46 08/28/2024 09:36 AM    ALT 37

## 2025-03-06 NOTE — DISCHARGE INSTRUCTIONS
Gastrointestinal Esophagogastroduodenoscopy (EGD)/ Endoscopic Ultrasound(EUS)- Upper Exam Discharge Instructions    1. Call Dr. Palafox for any problems or questions.  2. Contact the doctor's office for follow up appointment as directed.  3. Medication may cause drowsiness for several hours, therefore, do not drive or operate machinery for remainder of the day.  4. No alcohol today.  5. Do not make any important decisions such as signing legal paperwork.  6. Ordinarily, you may resume regular diet and activity after exam unless otherwise specified by your physician.  7. For mild soreness in your throat you may use Cepacol throat lozenges or warm  salt-water gargles as needed.    Impression: Normal examined duodenum. Gastritis, characterized by erythema. Biopsied. Z-line irregular. Biopsied. Non-obstructing Schatzki ring. Small hiatal hernia.     Recommendation: Await pathology results. Follow an antireflux regimen. Use Prilosec (omeprazole) 20 mg PO BID. Repeat upper endoscopy in 3 years for surveillance.       Gastrointestinal Colonoscopy/Flexible Sigmoidoscopy - Lower Exam Discharge Instructions  Call Dr. Palafox for any problems or questions.  Contact the doctor’s office for follow up appointment as directed  Medication may cause drowsiness for several hours, therefore, do not drive or operate machinery for remainder of the day.  No alcohol today.  Do not make any important decisions such signing legal paperwork.  Ordinarily, you may resume regular diet and activity after exam unless otherwise specified by your physician.  Because of air put into your colon during exam, you may experience some abdominal distension, relieved by the passage of gas, for several hours.  Contact your physician if you have any of the following:  Excessive amount of bleeding - large amount when having a bowel movement.  Occasional specks of blood with bowel movement would not be unusual.  Severe abdominal pain  Fever or Chills     Impression:

## 2025-03-13 ENCOUNTER — OFFICE VISIT (OUTPATIENT)
Age: 65
End: 2025-03-13
Payer: COMMERCIAL

## 2025-03-13 ENCOUNTER — RESULTS FOLLOW-UP (OUTPATIENT)
Dept: ENDOSCOPY | Age: 65
End: 2025-03-13

## 2025-03-13 VITALS
HEART RATE: 68 BPM | BODY MASS INDEX: 27.65 KG/M2 | HEIGHT: 72 IN | DIASTOLIC BLOOD PRESSURE: 70 MMHG | SYSTOLIC BLOOD PRESSURE: 134 MMHG

## 2025-03-13 DIAGNOSIS — R07.89 OTHER CHEST PAIN: Primary | ICD-10-CM

## 2025-03-13 DIAGNOSIS — I10 ESSENTIAL HYPERTENSION: ICD-10-CM

## 2025-03-13 PROCEDURE — 3078F DIAST BP <80 MM HG: CPT | Performed by: INTERNAL MEDICINE

## 2025-03-13 PROCEDURE — 99214 OFFICE O/P EST MOD 30 MIN: CPT | Performed by: INTERNAL MEDICINE

## 2025-03-13 PROCEDURE — 3075F SYST BP GE 130 - 139MM HG: CPT | Performed by: INTERNAL MEDICINE

## 2025-03-13 ASSESSMENT — ENCOUNTER SYMPTOMS
BACK PAIN: 0
COUGH: 0
HEMOPTYSIS: 0
ABDOMINAL PAIN: 0
NAUSEA: 0
ORTHOPNEA: 0
VOMITING: 0
SHORTNESS OF BREATH: 0
BLOATING: 0
BLURRED VISION: 0
DOUBLE VISION: 0

## 2025-03-13 NOTE — PROGRESS NOTES
[facility] CARDIOLOGY  [address] [place] [address], [address]  [place] [address]  PHONE: [phone]    25    NAME:  Vishnu Chiu  : 1960  MRN: [id number]         SUBJECTIVE:   Vishnu Chiu is a 64 y.o. male seen for a visit regarding the following:     Chief Complaint   Patient presents with    Hypertension     ECHO F/U       HPI:  (initial evaluation ~24)    No prior history of coronary disease.  History of hypertension.  Prior noted echocardiogram from 2014 with preserved EF and mild LVH.  Noted abnormal calcium score of 130 (Lcx; 2022).  Abnormal Cardiolite stress test from 2024 [small to medium sized reversible defect in the basal inferolateral segment; of note the patient also had chest discomfort at peak stress.  Ambulated 11 minutes and 21 seconds; David treadmill score with intermediate risk].  Underwent coronary angiogram with mild nonobstructive disease (25).  Echocardiogram from 2025 with hyperdynamic left ventricular function noted intracavitary gradient (moderate to severe LVH; ~110mmHg gradient).    Currently states no recurrent chest discomfort.  Still active with no issues.  Discussed noted echocardiogram.    Prior--visit from 2025, chest tightness with strenuous activity/SALAZAR noted initially about 1 yr ago; noted ~5 mins on a treadmill.  Some progression in symptoms but still active and works out 4-5 times a week in the gym.  At times recently however, states notices chest discomfort with walking around the block.  Denies any radiation.  No rest symptoms.  Symptoms alleviate with cessation of activity.  Home blood pressures with systolics in the 130s to 140s.    Chest discomfort-controlled, outflow gradient-not controlled, hypertension-controlled    Past Medical History, Past Surgical History, Family history, Social History, and Medications were all reviewed with the patient today and updated as necessary.     Allergies   Allergen Reactions    Penicillin G Hives

## 2025-04-02 PROBLEM — Z12.11 ENCOUNTER FOR SCREENING COLONOSCOPY: Status: RESOLVED | Noted: 2025-01-28 | Resolved: 2025-04-02

## 2025-07-18 ENCOUNTER — OFFICE VISIT (OUTPATIENT)
Age: 65
End: 2025-07-18
Payer: COMMERCIAL

## 2025-07-18 VITALS
HEART RATE: 58 BPM | SYSTOLIC BLOOD PRESSURE: 114 MMHG | HEIGHT: 72 IN | WEIGHT: 187 LBS | DIASTOLIC BLOOD PRESSURE: 64 MMHG | BODY MASS INDEX: 25.33 KG/M2

## 2025-07-18 DIAGNOSIS — R93.1 AGATSTON CORONARY ARTERY CALCIUM SCORE BETWEEN 100 AND 400: Primary | ICD-10-CM

## 2025-07-18 DIAGNOSIS — I10 ESSENTIAL HYPERTENSION: ICD-10-CM

## 2025-07-18 DIAGNOSIS — E78.2 HYPERLIPIDEMIA, MIXED: ICD-10-CM

## 2025-07-18 PROCEDURE — 3074F SYST BP LT 130 MM HG: CPT | Performed by: INTERNAL MEDICINE

## 2025-07-18 PROCEDURE — 99214 OFFICE O/P EST MOD 30 MIN: CPT | Performed by: INTERNAL MEDICINE

## 2025-07-18 PROCEDURE — 3078F DIAST BP <80 MM HG: CPT | Performed by: INTERNAL MEDICINE

## 2025-07-18 RX ORDER — ROSUVASTATIN CALCIUM 5 MG/1
5 TABLET, COATED ORAL
Qty: 90 TABLET | Refills: 3 | Status: SHIPPED | OUTPATIENT
Start: 2025-07-18

## 2025-07-18 ASSESSMENT — ENCOUNTER SYMPTOMS
BACK PAIN: 0
BLOATING: 0
DOUBLE VISION: 0
COUGH: 0
BLURRED VISION: 0
SHORTNESS OF BREATH: 0
ORTHOPNEA: 0
HEMOPTYSIS: 0
ABDOMINAL PAIN: 0
NAUSEA: 0
VOMITING: 0

## 2025-07-18 NOTE — PROGRESS NOTES
[facility] CARDIOLOGY  [address] [place] [address], [address]  [place] [address]  PHONE: [phone]    25    NAME:  Vishnu Chiu  : 1960  MRN: [id number]         SUBJECTIVE:   Vishnu Chiu is a 64 y.o. male seen for a visit regarding the following:     Chief Complaint   Patient presents with    Other chest pain       HPI:  (initial evaluation ~24)    No prior history of coronary disease.  History of hypertension.  Prior noted echocardiogram from 2014 with preserved EF and mild LVH.  Noted abnormal calcium score of 130 (Lcx; 2022).  Abnormal Cardiolite stress test from 2024 [small to medium sized reversible defect in the basal inferolateral segment; of note the patient also had chest discomfort at peak stress.  Ambulated 11 minutes and 21 seconds; David treadmill score with intermediate risk].  Underwent coronary angiogram with mild nonobstructive disease (25).  Echocardiogram from 2025 with hyperdynamic left ventricular function noted intracavitary gradient (moderate to severe LVH; ~110mmHg gradient).    Doing well since last visit.  Denies any chest discomfort.  More active.  No dyspnea on exertion.  States had some possible fatigue and atorvastatin 40 mg daily.    Prior--visit from 2025, chest tightness with strenuous activity/SALAZAR noted initially about 1 yr ago; noted ~5 mins on a treadmill.  Some progression in symptoms but still active and works out 4-5 times a week in the gym.  At times recently however, states notices chest discomfort with walking around the block.  Denies any radiation.  No rest symptoms.  Symptoms alleviate with cessation of activity.  Home blood pressures with systolics in the 130s to 140s.    Chest discomfort-controlled, outflow gradient-not controlled, hypertension-controlled    Past Medical History, Past Surgical History, Family history, Social History, and Medications were all reviewed with the patient today and updated as necessary.     Allergies

## (undated) DEVICE — SHEET, T, LAPAROTOMY, STERILE: Brand: MEDLINE

## (undated) DEVICE — REM POLYHESIVE ADULT PATIENT RETURN ELECTRODE: Brand: VALLEYLAB

## (undated) DEVICE — SUT PROL 2-0 30IN CT2 BLU --

## (undated) DEVICE — CATHETER DIAG 5FR L100CM LUMN ID0.047IN JR4 CRV 0 SIDE H

## (undated) DEVICE — MASTISOL ADHESIVE LIQ 2/3ML

## (undated) DEVICE — (D)PREP SKN CHLRAPRP APPL 26ML -- CONVERT TO ITEM 371833

## (undated) DEVICE — CATHETER ANGIO INFIN 038IN AMPLATZ 534545T

## (undated) DEVICE — SUTURE VCRL SZ 3-0 L27IN ABSRB UD L26MM CT-2 1/2 CIR J232H

## (undated) DEVICE — NEEDLE SYRINGE 18GA L1.5IN RED PLAS HUB S STL BLNT FILL W/O

## (undated) DEVICE — DISPOSABLE BIOPSY VALVE MAJ-1555: Brand: SINGLE USE BIOPSY VALVE (STERILE)

## (undated) DEVICE — FORCEPS BX L240CM JAW DIA2.8MM L CAP W/ NDL MIC MESH TOOTH

## (undated) DEVICE — SUTURE VCRL SZ 4-0 L27IN ABSRB UD L19MM PS-2 3/8 CIR PRIM J426H

## (undated) DEVICE — TELFA NON-ADHERENT ABSORBENT DRESSING: Brand: TELFA

## (undated) DEVICE — BAND COMPR L24CM REG CLR PLAS HEMSTAT EXT HK AND LOOP RETEN

## (undated) DEVICE — CATHETER COR DIAG PIGTAILS PIG 145 CRV 5FR 110CM 6 SIDE H

## (undated) DEVICE — CONNECTOR TBNG OD5-7MM O2 END DISP

## (undated) DEVICE — SPONGE: SPECIALTY TONSIL XR MED 100/CS: Brand: MEDICAL ACTION INDUSTRIES

## (undated) DEVICE — SYRINGE MED 10ML LUERLOCK TIP W/O SFTY DISP

## (undated) DEVICE — GAUZE,SPONGE,4"X4",12PLY,WOVEN,NS,LF: Brand: MEDLINE

## (undated) DEVICE — GARMENT,MEDLINE,DVT,INT,CALF,MED, GEN2: Brand: MEDLINE

## (undated) DEVICE — STRIP,CLOSURE,WOUND,MEDI-STRIP,1/2X4: Brand: MEDLINE

## (undated) DEVICE — SINGLE PORT MANIFOLD: Brand: NEPTUNE 2

## (undated) DEVICE — 2000CC GUARDIAN II: Brand: GUARDIAN

## (undated) DEVICE — GLIDESHEATH SLENDER STAINLESS STEEL KIT: Brand: GLIDESHEATH SLENDER

## (undated) DEVICE — YANKAUER,BULB TIP,W/O VENT,RIGID,STERILE: Brand: MEDLINE

## (undated) DEVICE — SOLUTION IV 1000ML 0.9% SOD CHL

## (undated) DEVICE — MOUTHPIECE ENDOSCP L CTRL OPN AND SIDE PORTS DISP

## (undated) DEVICE — SYRINGE MEDICAL 3ML CLEAR PLASTIC STANDARD NON CONTROL LUERLOCK TIP DISPOSABLE

## (undated) DEVICE — CONTAINER FORMALIN PREFILLED 10% NBF 60ML

## (undated) DEVICE — MINOR SPLIT GENERAL: Brand: MEDLINE INDUSTRIES, INC.

## (undated) DEVICE — BLOCK BITE AD 60FR W/ VELC STRP ADDRESSES MOST PT AND

## (undated) DEVICE — 3M™ TEGADERM™ TRANSPARENT FILM DRESSING FRAME STYLE, 1626W, 4 IN X 4-3/4 IN (10 CM X 12 CM), 50/CT 4CT/CASE: Brand: 3M™ TEGADERM™

## (undated) DEVICE — ENDOSCOPIC KIT 1.1+ OP4 CA DE 2 GWN AAMI LEVEL 3

## (undated) DEVICE — AIRLIFE™ OXYGEN TUBING 7 FEET (2.1 M) CRUSH RESISTANT OXYGEN TUBING, VINYL TIPPED: Brand: AIRLIFE™

## (undated) DEVICE — NEEDLE HYPO 21GA L1.5IN INTRAMUSCULAR S STL LATCH BVL UP

## (undated) DEVICE — GUIDEWIRE 035IN 210CM PTFE COAT FIX COR J TIP 15MM FIRM BODY

## (undated) DEVICE — KENDALL RADIOLUCENT FOAM MONITORING ELECTRODE RECTANGULAR SHAPE: Brand: KENDALL

## (undated) DEVICE — RADIFOCUS OPTITORQUE ANGIOGRAPHIC CATHETER: Brand: OPTITORQUE